# Patient Record
Sex: FEMALE | Race: BLACK OR AFRICAN AMERICAN | NOT HISPANIC OR LATINO | Employment: STUDENT | URBAN - METROPOLITAN AREA
[De-identification: names, ages, dates, MRNs, and addresses within clinical notes are randomized per-mention and may not be internally consistent; named-entity substitution may affect disease eponyms.]

---

## 2023-11-30 ENCOUNTER — HOSPITAL ENCOUNTER (OUTPATIENT)
Facility: HOSPITAL | Age: 18
End: 2023-11-30
Attending: OBSTETRICS & GYNECOLOGY | Admitting: OBSTETRICS & GYNECOLOGY
Payer: COMMERCIAL

## 2023-11-30 ENCOUNTER — HOSPITAL ENCOUNTER (OUTPATIENT)
Facility: HOSPITAL | Age: 18
Discharge: HOME | End: 2023-11-30
Attending: OBSTETRICS & GYNECOLOGY | Admitting: OBSTETRICS & GYNECOLOGY
Payer: COMMERCIAL

## 2023-11-30 VITALS
RESPIRATION RATE: 16 BRPM | HEIGHT: 69 IN | TEMPERATURE: 99 F | OXYGEN SATURATION: 95 % | WEIGHT: 201.72 LBS | HEART RATE: 86 BPM | DIASTOLIC BLOOD PRESSURE: 80 MMHG | SYSTOLIC BLOOD PRESSURE: 120 MMHG | BODY MASS INDEX: 29.88 KG/M2

## 2023-11-30 LAB
ABO GROUP (TYPE) IN BLOOD: NORMAL
ANTIBODY SCREEN: NORMAL
BILIRUBIN, POC: NEGATIVE
BLOOD URINE, POC: POSITIVE
C TRACH RRNA SPEC QL NAA+PROBE: NEGATIVE
CLARITY, POC: CLEAR
COLOR, POC: YELLOW
ERYTHROCYTE [DISTWIDTH] IN BLOOD BY AUTOMATED COUNT: 13 % (ref 11.5–14.5)
GLUCOSE URINE, POC: NEGATIVE
HBV SURFACE AG SERPL QL IA: NONREACTIVE
HCT VFR BLD AUTO: 38 % (ref 36–46)
HCV AB SER QL: NONREACTIVE
HGB BLD-MCNC: 12.4 G/DL (ref 12–16)
HIV 1+2 AB+HIV1 P24 AG SERPL QL IA: NONREACTIVE
KETONES, POC: NEGATIVE
LEUKOCYTE EST, POC: NORMAL
MCH RBC QN AUTO: 30.5 PG (ref 26–34)
MCHC RBC AUTO-ENTMCNC: 32.6 G/DL (ref 32–36)
MCV RBC AUTO: 94 FL (ref 80–100)
N GONORRHOEA DNA SPEC QL PROBE+SIG AMP: NEGATIVE
NITRITE, POC: POSITIVE
NRBC BLD-RTO: 0 /100 WBCS (ref 0–0)
PH, POC: 6.5
PLATELET # BLD AUTO: 270 X10*3/UL (ref 150–450)
POC APPEARANCE OF BODY FLUID: NORMAL
RBC # BLD AUTO: 4.06 X10*6/UL (ref 4–5.2)
RH FACTOR (ANTIGEN D): NORMAL
RUBV IGG SERPL IA-ACNC: 1.2 IA
RUBV IGG SERPL QL IA: POSITIVE
SPECIFIC GRAVITY, POC: 1.02
T VAGINALIS RRNA SPEC QL NAA+PROBE: NEGATIVE
URINE PROTEIN, POC: NEGATIVE
UROBILINOGEN, POC: 0.2
WBC # BLD AUTO: 11.6 X10*3/UL (ref 4.4–11.3)

## 2023-11-30 PROCEDURE — 87661 TRICHOMONAS VAGINALIS AMPLIF: CPT | Mod: 59 | Performed by: OBSTETRICS & GYNECOLOGY

## 2023-11-30 PROCEDURE — 86901 BLOOD TYPING SEROLOGIC RH(D): CPT | Performed by: OBSTETRICS & GYNECOLOGY

## 2023-11-30 PROCEDURE — 87800 DETECT AGNT MULT DNA DIREC: CPT | Performed by: OBSTETRICS & GYNECOLOGY

## 2023-11-30 PROCEDURE — 87086 URINE CULTURE/COLONY COUNT: CPT | Performed by: OBSTETRICS & GYNECOLOGY

## 2023-11-30 PROCEDURE — 87340 HEPATITIS B SURFACE AG IA: CPT | Performed by: OBSTETRICS & GYNECOLOGY

## 2023-11-30 PROCEDURE — 86803 HEPATITIS C AB TEST: CPT | Performed by: OBSTETRICS & GYNECOLOGY

## 2023-11-30 PROCEDURE — 85027 COMPLETE CBC AUTOMATED: CPT | Performed by: OBSTETRICS & GYNECOLOGY

## 2023-11-30 PROCEDURE — 86317 IMMUNOASSAY INFECTIOUS AGENT: CPT | Performed by: OBSTETRICS & GYNECOLOGY

## 2023-11-30 PROCEDURE — 36415 COLL VENOUS BLD VENIPUNCTURE: CPT | Performed by: OBSTETRICS & GYNECOLOGY

## 2023-11-30 PROCEDURE — 99203 OFFICE O/P NEW LOW 30 MIN: CPT | Performed by: OBSTETRICS & GYNECOLOGY

## 2023-11-30 PROCEDURE — 87389 HIV-1 AG W/HIV-1&-2 AB AG IA: CPT | Performed by: OBSTETRICS & GYNECOLOGY

## 2023-11-30 ASSESSMENT — COLUMBIA-SUICIDE SEVERITY RATING SCALE - C-SSRS
6. HAVE YOU EVER DONE ANYTHING, STARTED TO DO ANYTHING, OR PREPARED TO DO ANYTHING TO END YOUR LIFE?: NO
2. HAVE YOU ACTUALLY HAD ANY THOUGHTS OF KILLING YOURSELF?: NO
1. IN THE PAST MONTH, HAVE YOU WISHED YOU WERE DEAD OR WISHED YOU COULD GO TO SLEEP AND NOT WAKE UP?: NO

## 2023-11-30 ASSESSMENT — PAIN - FUNCTIONAL ASSESSMENT: PAIN_FUNCTIONAL_ASSESSMENT: 0-10

## 2023-11-30 ASSESSMENT — PAIN SCALES - GENERAL: PAINLEVEL_OUTOF10: 0 - NO PAIN

## 2023-11-30 NOTE — H&P
"Triage Note  A/P: 18 year old  at 20+2 weeks by bedside ultrasound today presenting for evaluation of newly diagnosed pregnancy. No concerns today    - prenatal labs ordered  - dating ultrasound ordered  - message sent to [place] [phone] office to call her to facilitate initial OB visit   - UA with positive nitrites, asymptomatic, will follow up with uri  - stable for discharge home for outpatient follow up.       HPI:   18 year old  at 20+2 by bedside ultrasound today (LMP known but cycles irregular) presenting to triage to make sure everything is ok with the pregnancy. She found out she was pregnancy at a family medicine office visit at [facility] last week. She was supposed to have an ultrasound on  but it was cancelled due to the snow. She is sure of her LMP but her menses were irregular so she did not realize she was pregnant.   She would like to get prenatal care at .      Obstetrical History   OB History    Para Term  AB Living   1             SAB IAB Ectopic Multiple Live Births                  # Outcome Date GA Lbr Chivo/2nd Weight Sex Delivery Anes PTL Lv   1 Current                Past Medical History  History reviewed. No pertinent past medical history.     Past Surgical History   History reviewed. No pertinent surgical history.    Social History  Social History     Tobacco Use    Smoking status: Unknown    Smokeless tobacco: Not on file   Substance Use Topics    Alcohol use: Not on file     Substance and Sexual Activity   Drug Use Not on file       Allergies  Patient has no known allergies.     Medications  Medications Prior to Admission   Medication Sig Dispense Refill Last Dose    PRENATAL 2-IRON-FOLIC ACID-OM3 ORAL Take by mouth.   2023       Objective    Last Vitals  Temp Pulse Resp BP MAP O2 Sat   37.2 °C (99 °F) 102 16 139/76   95 %     Physical Examination  Objective   /80   Pulse 86   Temp 37.2 °C (99 °F) (Skin)   Resp 16   Ht 1.753 m (5' 9.02\")   Wt 91.5 " kg (201 lb 11.5 oz)   SpO2 95%   BMI 29.78 kg/m²      General:   Alert and oriented, in no acute distress   Neck: Supple. No visible thyromegaly.    Breast/Axilla:    Abdomen: Soft, non-tender, without masses or organomegaly   Vulva:    Vagina:    Cervix:    Uterus:    Adnexa:    Pelvic Floor    Psych Normal affect. Normal mood.          Lab Review  None

## 2023-12-02 LAB — BACTERIA UR CULT: ABNORMAL

## 2023-12-04 ENCOUNTER — TELEPHONE (OUTPATIENT)
Dept: OBSTETRICS AND GYNECOLOGY | Facility: HOSPITAL | Age: 18
End: 2023-12-04
Payer: COMMERCIAL

## 2023-12-06 ENCOUNTER — TELEPHONE (OUTPATIENT)
Dept: OBSTETRICS AND GYNECOLOGY | Facility: HOSPITAL | Age: 18
End: 2023-12-06
Payer: COMMERCIAL

## 2023-12-10 ENCOUNTER — TELEPHONE (OUTPATIENT)
Dept: PEDIATRICS | Facility: HOSPITAL | Age: 18
End: 2023-12-10
Payer: COMMERCIAL

## 2023-12-10 ENCOUNTER — TELEPHONE (OUTPATIENT)
Dept: OBSTETRICS AND GYNECOLOGY | Facility: HOSPITAL | Age: 18
End: 2023-12-10
Payer: COMMERCIAL

## 2023-12-10 DIAGNOSIS — O23.42: Primary | ICD-10-CM

## 2023-12-10 RX ORDER — NITROFURANTOIN 25; 75 MG/1; MG/1
100 CAPSULE ORAL EVERY 12 HOURS SCHEDULED
Qty: 14 CAPSULE | Refills: 0 | Status: SHIPPED | OUTPATIENT
Start: 2023-12-10 | End: 2023-12-17

## 2023-12-10 NOTE — TELEPHONE ENCOUNTER
Pt seen in triage 11/30/23. + UTI  After 4 attempts to reach pt and no return phone call from pt,  script sent to pharmacy on record for Macrobid 100 Q 12 x 7 days based on culture sensitivity.     Jenelle Rasheed, APRN-CNP

## 2023-12-12 ENCOUNTER — INITIAL PRENATAL (OUTPATIENT)
Dept: OBSTETRICS AND GYNECOLOGY | Facility: CLINIC | Age: 18
End: 2023-12-12
Payer: COMMERCIAL

## 2023-12-12 VITALS — WEIGHT: 197 LBS | BODY MASS INDEX: 29.08 KG/M2 | SYSTOLIC BLOOD PRESSURE: 122 MMHG | DIASTOLIC BLOOD PRESSURE: 80 MMHG

## 2023-12-12 DIAGNOSIS — Z34.80 SUPERVISION OF OTHER NORMAL PREGNANCY, ANTEPARTUM (HHS-HCC): Primary | ICD-10-CM

## 2023-12-12 DIAGNOSIS — Z3A.20 20 WEEKS GESTATION OF PREGNANCY (HHS-HCC): ICD-10-CM

## 2023-12-12 PROCEDURE — 99214 OFFICE O/P EST MOD 30 MIN: CPT | Performed by: OBSTETRICS & GYNECOLOGY

## 2023-12-12 PROCEDURE — 88175 CYTOPATH C/V AUTO FLUID REDO: CPT

## 2023-12-12 RX ORDER — PRENATAL WITH FERROUS FUM AND FOLIC ACID 3080; 920; 120; 400; 22; 1.84; 3; 20; 10; 1; 12; 200; 27; 25; 2 [IU]/1; [IU]/1; MG/1; [IU]/1; MG/1; MG/1; MG/1; MG/1; MG/1; MG/1; UG/1; MG/1; MG/1; MG/1; MG/1
1 TABLET ORAL DAILY
COMMUNITY
Start: 2023-11-26

## 2023-12-12 ASSESSMENT — EDINBURGH POSTNATAL DEPRESSION SCALE (EPDS)
I HAVE FELT SAD OR MISERABLE: NO, NOT AT ALL
THINGS HAVE BEEN GETTING ON TOP OF ME: NO, I HAVE BEEN COPING AS WELL AS EVER
I HAVE BEEN SO UNHAPPY THAT I HAVE HAD DIFFICULTY SLEEPING: NOT AT ALL
I HAVE BEEN ANXIOUS OR WORRIED FOR NO GOOD REASON: HARDLY EVER
I HAVE FELT SCARED OR PANICKY FOR NO GOOD REASON: NO, NOT AT ALL
TOTAL SCORE: 1
I HAVE BEEN ABLE TO LAUGH AND SEE THE FUNNY SIDE OF THINGS: AS MUCH AS I ALWAYS COULD
I HAVE BEEN SO UNHAPPY THAT I HAVE BEEN CRYING: NO, NEVER
I HAVE LOOKED FORWARD WITH ENJOYMENT TO THINGS: AS MUCH AS I EVER DID
I HAVE BLAMED MYSELF UNNECESSARILY WHEN THINGS WENT WRONG: NO, NEVER
THE THOUGHT OF HARMING MYSELF HAS OCCURRED TO ME: NEVER

## 2023-12-12 NOTE — PROGRESS NOTES
Subjective   Patient ID [id number]   Pia Olivo is a 18 y.o.  at 24w0d with a working estimated date of delivery of 2024, by Last Menstrual Period who presents for an initial prenatal visit. This pregnancy is planned.    Her pregnancy is complicated by:  Late care    OB History    Para Term  AB Living   2 0     1     SAB IAB Ectopic Multiple Live Births     1            # Outcome Date GA Lbr Chivo/2nd Weight Sex Delivery Anes PTL Lv   2 Current            1 IAB              [place]  Depression Scale Total: 1    Objective   Physical Exam  Weight: 89.4 kg (197 lb)  Expected Total Weight Gain: 7 kg (15 lb)-11.5 kg (25 lb)   Pregravid BMI: 27.16  BP: 122/80    Fetal Heart Rate: 147     OBGyn Exam    Prenatal Labs  done    Assessment/Plan   Problem List Items Addressed This Visit    None  Visit Diagnoses         Codes    Supervision of other normal pregnancy, antepartum    -  Primary Z34.80    Relevant Orders    THINPREP PAP    US MAC OB imaging order    20 weeks gestation of pregnancy     Z3A.20              Prenatal Labs ordered  Daily prenatal vitamins prescribed  First trimester screening and second trimester screening discussed. Patient decided to \  Follow up in 2 weeks for return OB visit.

## 2023-12-28 ENCOUNTER — ANCILLARY PROCEDURE (OUTPATIENT)
Dept: RADIOLOGY | Facility: CLINIC | Age: 18
End: 2023-12-28
Payer: COMMERCIAL

## 2023-12-28 DIAGNOSIS — Z34.80 SUPERVISION OF OTHER NORMAL PREGNANCY, ANTEPARTUM (HHS-HCC): ICD-10-CM

## 2023-12-28 LAB
CYTOLOGY CMNT CVX/VAG CYTO-IMP: NORMAL
LAB AP HPV GENOTYPE QUESTION: YES
LAB AP HPV HR: NORMAL
LABORATORY COMMENT REPORT: NORMAL
LMP START DATE: NORMAL
PATH REPORT.TOTAL CANCER: NORMAL

## 2023-12-28 PROCEDURE — 76811 OB US DETAILED SNGL FETUS: CPT

## 2023-12-28 PROCEDURE — 76811 OB US DETAILED SNGL FETUS: CPT | Performed by: STUDENT IN AN ORGANIZED HEALTH CARE EDUCATION/TRAINING PROGRAM

## 2023-12-28 PROCEDURE — 76820 UMBILICAL ARTERY ECHO: CPT | Performed by: STUDENT IN AN ORGANIZED HEALTH CARE EDUCATION/TRAINING PROGRAM

## 2023-12-29 ENCOUNTER — APPOINTMENT (OUTPATIENT)
Dept: OBSTETRICS AND GYNECOLOGY | Facility: HOSPITAL | Age: 18
End: 2023-12-29
Payer: COMMERCIAL

## 2024-01-04 ENCOUNTER — OFFICE VISIT (OUTPATIENT)
Dept: OBSTETRICS AND GYNECOLOGY | Facility: CLINIC | Age: 19
End: 2024-01-04
Payer: COMMERCIAL

## 2024-01-04 ENCOUNTER — LAB (OUTPATIENT)
Dept: LAB | Facility: LAB | Age: 19
End: 2024-01-04
Payer: COMMERCIAL

## 2024-01-04 VITALS — BODY MASS INDEX: 29.52 KG/M2 | DIASTOLIC BLOOD PRESSURE: 60 MMHG | SYSTOLIC BLOOD PRESSURE: 112 MMHG | WEIGHT: 200 LBS

## 2024-01-04 DIAGNOSIS — Z34.80 SUPERVISION OF OTHER NORMAL PREGNANCY, ANTEPARTUM (HHS-HCC): ICD-10-CM

## 2024-01-04 DIAGNOSIS — Z34.80 SUPERVISION OF OTHER NORMAL PREGNANCY, ANTEPARTUM (HHS-HCC): Primary | ICD-10-CM

## 2024-01-04 DIAGNOSIS — Z3A.24 24 WEEKS GESTATION OF PREGNANCY (HHS-HCC): ICD-10-CM

## 2024-01-04 PROCEDURE — 99213 OFFICE O/P EST LOW 20 MIN: CPT | Performed by: OBSTETRICS & GYNECOLOGY

## 2024-01-04 PROCEDURE — 36415 COLL VENOUS BLD VENIPUNCTURE: CPT

## 2024-01-04 PROCEDURE — 1036F TOBACCO NON-USER: CPT | Performed by: OBSTETRICS & GYNECOLOGY

## 2024-01-04 NOTE — PROGRESS NOTES
"Subjective   Patient ID [id number]   Pia Olivo is a 18 y.o.  at 24w1d with a working estimated date of delivery of 2024, by Ultrasound who presents for a routine prenatal visit. She denies vaginal bleeding, leakage of fluid, decreased fetal movements, or contractions.    Her pregnancy is complicated by:  none    Objective   Physical Exam  Weight: 90.7 kg (200 lb)  Expected Total Weight Gain: 7 kg (15 lb)-11.5 kg (25 lb)   Pregravid BMI: 27.16  BP: 112/60  Fetal Heart Rate: 144      Prenatal Labs  Urine dip:  Lab Results   Component Value Date    KETONESU Negative 2023    GLUCOSEUR NEGATIVE 2023    LEUKOCYTESUR 1+ 2023       Lab Results   Component Value Date    HGB 12.4 2023    HCT 38.0 2023    ABO A 2023    HEPBSAG Nonreactive 2023     No results found for: \"PAPPA\", \"AFP\", \"HCG\", \"ESTRIOL\", \"INHBA\"  No results found for: \"GLUF\", \"GLUT1\", \"BIUUHBZ2QK\", \"YOMOQYZ3QM\"    Imaging  The most recent ultrasound was performed on The most recent ultrasound study is not finalized with a study GA of The most recent ultrasound study is not finalized and EFW of The most recent ultrasound study is not finalized.  The most recent ultrasound study is not finalized  The most recent ultrasound study is not finalized    Assessment/Plan   Problem List Items Addressed This Visit    None  Visit Diagnoses         Codes    Supervision of other normal pregnancy, antepartum    -  Primary Z34.80    Relevant Orders    [facility] Prenatal Screen    24 weeks gestation of pregnancy     Z3A.24    Relevant Orders    [facility] Prenatal Screen            Continue prenatal vitamin.  Labs reviewed.  Follow up in 4 weeks for a routine prenatal visit.  "

## 2024-01-19 ENCOUNTER — HOSPITAL ENCOUNTER (OUTPATIENT)
Dept: RADIOLOGY | Facility: CLINIC | Age: 19
Discharge: HOME | End: 2024-01-19
Payer: COMMERCIAL

## 2024-01-19 DIAGNOSIS — Z32.01 PREGNANCY TEST POSITIVE (HHS-HCC): ICD-10-CM

## 2024-01-19 DIAGNOSIS — O36.5990 IUGR (INTRAUTERINE GROWTH RESTRICTION) AFFECTING CARE OF MOTHER (HHS-HCC): ICD-10-CM

## 2024-01-19 PROCEDURE — 76820 UMBILICAL ARTERY ECHO: CPT

## 2024-01-19 PROCEDURE — 76816 OB US FOLLOW-UP PER FETUS: CPT

## 2024-01-19 PROCEDURE — 76820 UMBILICAL ARTERY ECHO: CPT | Performed by: OBSTETRICS & GYNECOLOGY

## 2024-01-19 PROCEDURE — 76816 OB US FOLLOW-UP PER FETUS: CPT | Performed by: OBSTETRICS & GYNECOLOGY

## 2024-01-19 PROCEDURE — 76819 FETAL BIOPHYS PROFIL W/O NST: CPT | Performed by: OBSTETRICS & GYNECOLOGY

## 2024-02-01 ENCOUNTER — LAB (OUTPATIENT)
Dept: LAB | Facility: LAB | Age: 19
End: 2024-02-01
Payer: COMMERCIAL

## 2024-02-01 ENCOUNTER — ROUTINE PRENATAL (OUTPATIENT)
Dept: OBSTETRICS AND GYNECOLOGY | Facility: CLINIC | Age: 19
End: 2024-02-01
Payer: COMMERCIAL

## 2024-02-01 VITALS — WEIGHT: 200.6 LBS | DIASTOLIC BLOOD PRESSURE: 82 MMHG | SYSTOLIC BLOOD PRESSURE: 118 MMHG | BODY MASS INDEX: 29.61 KG/M2

## 2024-02-01 DIAGNOSIS — Z3A.28 28 WEEKS GESTATION OF PREGNANCY (HHS-HCC): ICD-10-CM

## 2024-02-01 DIAGNOSIS — Z34.80 SUPERVISION OF OTHER NORMAL PREGNANCY, ANTEPARTUM (HHS-HCC): Primary | ICD-10-CM

## 2024-02-01 DIAGNOSIS — Z34.80 SUPERVISION OF OTHER NORMAL PREGNANCY, ANTEPARTUM (HHS-HCC): ICD-10-CM

## 2024-02-01 LAB
ERYTHROCYTE [DISTWIDTH] IN BLOOD BY AUTOMATED COUNT: 12.3 % (ref 11.5–14.5)
GLUCOSE 1H P 50 G GLC PO SERPL-MCNC: 62 MG/DL
HCT VFR BLD AUTO: 37.1 % (ref 36–46)
HGB BLD-MCNC: 12.1 G/DL (ref 12–16)
MCH RBC QN AUTO: 30.9 PG (ref 26–34)
MCHC RBC AUTO-ENTMCNC: 32.6 G/DL (ref 32–36)
MCV RBC AUTO: 95 FL (ref 80–100)
NRBC BLD-RTO: 0 /100 WBCS (ref 0–0)
PLATELET # BLD AUTO: 256 X10*3/UL (ref 150–450)
RBC # BLD AUTO: 3.92 X10*6/UL (ref 4–5.2)
REFLEX ADDED, ANEMIA PANEL: NORMAL
TREPONEMA PALLIDUM IGG+IGM AB [PRESENCE] IN SERUM OR PLASMA BY IMMUNOASSAY: NONREACTIVE
WBC # BLD AUTO: 8.3 X10*3/UL (ref 4.4–11.3)

## 2024-02-01 PROCEDURE — 99213 OFFICE O/P EST LOW 20 MIN: CPT | Performed by: OBSTETRICS & GYNECOLOGY

## 2024-02-01 PROCEDURE — 85027 COMPLETE CBC AUTOMATED: CPT

## 2024-02-01 PROCEDURE — 86780 TREPONEMA PALLIDUM: CPT

## 2024-02-01 PROCEDURE — 36415 COLL VENOUS BLD VENIPUNCTURE: CPT

## 2024-02-01 PROCEDURE — 82947 ASSAY GLUCOSE BLOOD QUANT: CPT

## 2024-02-01 NOTE — PROGRESS NOTES
"Subjective   Patient ID [id number]   Pia Olivo is a 18 y.o.  at 28w1d with a working estimated date of delivery of 2024, by Ultrasound who presents for a routine prenatal visit. She denies vaginal bleeding, leakage of fluid, decreased fetal movements, or contractions.    Her pregnancy is complicated by:  none    Objective   Physical Exam:   Weight: 91 kg (200 lb 9.6 oz)  Expected Total Weight Gain: 7 kg (15 lb)-11.5 kg (25 lb)   Pregravid BMI: 27.16  BP: 118/82  Fetal Heart Rate: 152 Fundal Height (cm): 29 cm             Prenatal Labs  Urine Dip:  Lab Results   Component Value Date    KETONESU Negative 2023    GLUCOSEUR NEGATIVE 2023    LEUKOCYTESUR 1+ 2023     Lab Results   Component Value Date    HGB 12.4 2023    HCT 38.0 2023    ABO A 2023    HEPBSAG Nonreactive 2023     No results found for: \"PAPPA\", \"AFP\", \"HCG\", \"ESTRIOL\", \"INHBA\"  No results found for: \"GLUF\", \"GLUT1\", \"HEKURYE9JR\", \"[id number]\"    Imaging  The most recent ultrasound was performed on   with a study GA of   and EFW of  .          Assessment/Plan   Problem List Items Addressed This Visit    None  Visit Diagnoses         Codes    Supervision of other normal pregnancy, antepartum    -  Primary Z34.80    Relevant Orders    Glucose, 1 Hour Screen, Pregnancy    Syphilis Screen with Reflex    CBC Anemia Panel With Reflex, Pregnancy    28 weeks gestation of pregnancy     Z3A.28    Relevant Orders    Glucose, 1 Hour Screen, Pregnancy    Syphilis Screen with Reflex    CBC Anemia Panel With Reflex, Pregnancy          Continue prenatal vitamin.  Labs reviewed.  GBS taken.  Expected mode of delivery vag  Follow up in 1 week for a routine prenatal visit.  "

## 2024-02-12 ENCOUNTER — APPOINTMENT (OUTPATIENT)
Dept: OBSTETRICS AND GYNECOLOGY | Facility: CLINIC | Age: 19
End: 2024-02-12
Payer: COMMERCIAL

## 2024-02-12 ENCOUNTER — HOSPITAL ENCOUNTER (OUTPATIENT)
Dept: RADIOLOGY | Facility: CLINIC | Age: 19
Discharge: HOME | End: 2024-02-12
Payer: COMMERCIAL

## 2024-02-12 DIAGNOSIS — Z32.01 PREGNANCY TEST POSITIVE (HHS-HCC): ICD-10-CM

## 2024-02-12 DIAGNOSIS — Z36.4 ULTRASOUND FOR ANTENATAL SCREENING FOR FETAL GROWTH RESTRICTION (HHS-HCC): Primary | ICD-10-CM

## 2024-02-12 PROCEDURE — 76816 OB US FOLLOW-UP PER FETUS: CPT | Performed by: MEDICAL GENETICS

## 2024-02-12 PROCEDURE — 76819 FETAL BIOPHYS PROFIL W/O NST: CPT

## 2024-02-12 PROCEDURE — 76816 OB US FOLLOW-UP PER FETUS: CPT

## 2024-02-12 PROCEDURE — 76820 UMBILICAL ARTERY ECHO: CPT | Performed by: MEDICAL GENETICS

## 2024-02-12 PROCEDURE — 76820 UMBILICAL ARTERY ECHO: CPT

## 2024-02-12 PROCEDURE — 76819 FETAL BIOPHYS PROFIL W/O NST: CPT | Performed by: MEDICAL GENETICS

## 2024-02-13 ENCOUNTER — ROUTINE PRENATAL (OUTPATIENT)
Dept: OBSTETRICS AND GYNECOLOGY | Facility: CLINIC | Age: 19
End: 2024-02-13
Payer: COMMERCIAL

## 2024-02-13 VITALS — BODY MASS INDEX: 30.26 KG/M2 | WEIGHT: 205 LBS | DIASTOLIC BLOOD PRESSURE: 80 MMHG | SYSTOLIC BLOOD PRESSURE: 122 MMHG

## 2024-02-13 DIAGNOSIS — Z3A.29 29 WEEKS GESTATION OF PREGNANCY (HHS-HCC): ICD-10-CM

## 2024-02-13 DIAGNOSIS — O36.5990 FETAL GROWTH RESTRICTION ANTEPARTUM (HHS-HCC): Primary | ICD-10-CM

## 2024-02-13 PROCEDURE — 99213 OFFICE O/P EST LOW 20 MIN: CPT | Performed by: OBSTETRICS & GYNECOLOGY

## 2024-02-13 NOTE — PROGRESS NOTES
Subjective   Patient ID [id number]   Pia Olivo is a 18 y.o.  at 29w6d with a working estimated date of delivery of 2024, by Ultrasound who presents for a routine prenatal visit. She denies vaginal bleeding, leakage of fluid, decreased fetal movements, or contractions.    Her pregnancy is complicated by:  FGR    Objective   Physical Exam:   Weight: 93 kg (205 lb)  Expected Total Weight Gain: 7 kg (15 lb)-11.5 kg (25 lb)   Pregravid BMI: 27.16  BP: 122/80  Fetal Heart Rate: 156 Fundal Height (cm): 31 cm             Prenatal Labs  Urine Dip:  Lab Results   Component Value Date    KETONESU Negative 2023    GLUCOSEUR NEGATIVE 2023    LEUKOCYTESUR 1+ 2023     Lab Results   Component Value Date    HGB 12.1 2024    HCT 37.1 2024    ABO A 2023    HEPBSAG Nonreactive 2023           Assessment/Plan   Problem List Items Addressed This Visit    None  Visit Diagnoses         Codes    Fetal growth restriction antepartum    -  Primary O36.5990    29 weeks gestation of pregnancy     Z3A.29          Continue prenatal vitamin.  Labs reviewed.  GBS taken.  Expected mode of delivery vag  Follow up in 1 week for a routine prenatal visit.

## 2024-02-22 ENCOUNTER — HOSPITAL ENCOUNTER (OUTPATIENT)
Dept: RADIOLOGY | Facility: CLINIC | Age: 19
Discharge: HOME | End: 2024-02-22
Payer: COMMERCIAL

## 2024-02-22 DIAGNOSIS — Z36.4 ULTRASOUND FOR ANTENATAL SCREENING FOR FETAL GROWTH RESTRICTION (HHS-HCC): ICD-10-CM

## 2024-02-22 PROCEDURE — 76819 FETAL BIOPHYS PROFIL W/O NST: CPT | Performed by: STUDENT IN AN ORGANIZED HEALTH CARE EDUCATION/TRAINING PROGRAM

## 2024-02-22 PROCEDURE — 76820 UMBILICAL ARTERY ECHO: CPT | Performed by: STUDENT IN AN ORGANIZED HEALTH CARE EDUCATION/TRAINING PROGRAM

## 2024-02-22 PROCEDURE — 76820 UMBILICAL ARTERY ECHO: CPT

## 2024-02-22 PROCEDURE — 76819 FETAL BIOPHYS PROFIL W/O NST: CPT

## 2024-02-27 ENCOUNTER — ROUTINE PRENATAL (OUTPATIENT)
Dept: OBSTETRICS AND GYNECOLOGY | Facility: CLINIC | Age: 19
End: 2024-02-27
Payer: COMMERCIAL

## 2024-02-27 VITALS — SYSTOLIC BLOOD PRESSURE: 118 MMHG | WEIGHT: 202 LBS | BODY MASS INDEX: 29.82 KG/M2 | DIASTOLIC BLOOD PRESSURE: 70 MMHG

## 2024-02-27 DIAGNOSIS — O36.5990 FETAL GROWTH RESTRICTION ANTEPARTUM (HHS-HCC): Primary | ICD-10-CM

## 2024-02-27 DIAGNOSIS — Z3A.31 31 WEEKS GESTATION OF PREGNANCY (HHS-HCC): ICD-10-CM

## 2024-02-27 PROCEDURE — 99213 OFFICE O/P EST LOW 20 MIN: CPT | Performed by: OBSTETRICS & GYNECOLOGY

## 2024-02-27 NOTE — PROGRESS NOTES
Subjective   Patient ID [id number]   Pai Olivo is a 19 y.o.  at 31w6d with a working estimated date of delivery of 2024, by Ultrasound who presents for a routine prenatal visit. She denies vaginal bleeding, leakage of fluid, decreased fetal movements, or contractions.    Her pregnancy is complicated by:  FGR    Objective   Physical Exam:   Weight: 91.6 kg (202 lb)  Expected Total Weight Gain: 7 kg (15 lb)-11.5 kg (25 lb)   Pregravid BMI: 27.16  BP: 118/70  Fetal Heart Rate: 142 Fundal Height (cm): 31 cm             Prenatal Labs  Urine Dip:  Lab Results   Component Value Date    KETONESU Negative 2023    GLUCOSEUR NEGATIVE 2023    LEUKOCYTESUR 1+ 2023     Lab Results   Component Value Date    HGB 12.1 2024    HCT 37.1 2024    ABO A 2023    HEPBSAG Nonreactive 2023       Assessment/Plan   Problem List Items Addressed This Visit    None  Visit Diagnoses         Codes    Fetal growth restriction antepartum    -  Primary O36.5990    31 weeks gestation of pregnancy     Z3A.31          Continue prenatal vitamin.  Labs reviewed.  Expected mode of delivery vag  Follow up in 1 week for a routine prenatal visit.

## 2024-03-01 ENCOUNTER — HOSPITAL ENCOUNTER (OUTPATIENT)
Dept: RADIOLOGY | Facility: CLINIC | Age: 19
Discharge: HOME | End: 2024-03-01
Payer: COMMERCIAL

## 2024-03-01 DIAGNOSIS — Z36.4 ULTRASOUND FOR ANTENATAL SCREENING FOR FETAL GROWTH RESTRICTION (HHS-HCC): ICD-10-CM

## 2024-03-01 DIAGNOSIS — Z32.01 PREGNANCY TEST POSITIVE (HHS-HCC): ICD-10-CM

## 2024-03-01 PROCEDURE — 76820 UMBILICAL ARTERY ECHO: CPT | Performed by: OBSTETRICS & GYNECOLOGY

## 2024-03-01 PROCEDURE — 76819 FETAL BIOPHYS PROFIL W/O NST: CPT

## 2024-03-01 PROCEDURE — 76820 UMBILICAL ARTERY ECHO: CPT

## 2024-03-01 PROCEDURE — 76819 FETAL BIOPHYS PROFIL W/O NST: CPT | Performed by: OBSTETRICS & GYNECOLOGY

## 2024-03-08 ENCOUNTER — HOSPITAL ENCOUNTER (OUTPATIENT)
Dept: RADIOLOGY | Facility: CLINIC | Age: 19
Discharge: HOME | End: 2024-03-08
Payer: COMMERCIAL

## 2024-03-08 DIAGNOSIS — Z32.01 PREGNANCY TEST POSITIVE (HHS-HCC): ICD-10-CM

## 2024-03-08 PROCEDURE — 76816 OB US FOLLOW-UP PER FETUS: CPT

## 2024-03-08 PROCEDURE — 76819 FETAL BIOPHYS PROFIL W/O NST: CPT

## 2024-03-08 PROCEDURE — 76816 OB US FOLLOW-UP PER FETUS: CPT | Performed by: OBSTETRICS & GYNECOLOGY

## 2024-03-08 PROCEDURE — 76819 FETAL BIOPHYS PROFIL W/O NST: CPT | Performed by: OBSTETRICS & GYNECOLOGY

## 2024-03-12 ENCOUNTER — ROUTINE PRENATAL (OUTPATIENT)
Dept: OBSTETRICS AND GYNECOLOGY | Facility: CLINIC | Age: 19
End: 2024-03-12
Payer: COMMERCIAL

## 2024-03-12 VITALS — DIASTOLIC BLOOD PRESSURE: 72 MMHG | BODY MASS INDEX: 30.11 KG/M2 | WEIGHT: 204 LBS | SYSTOLIC BLOOD PRESSURE: 123 MMHG

## 2024-03-12 DIAGNOSIS — Z3A.33 33 WEEKS GESTATION OF PREGNANCY (HHS-HCC): ICD-10-CM

## 2024-03-12 DIAGNOSIS — O36.5990 FETAL GROWTH RESTRICTION ANTEPARTUM (HHS-HCC): Primary | ICD-10-CM

## 2024-03-12 PROCEDURE — 99213 OFFICE O/P EST LOW 20 MIN: CPT | Performed by: OBSTETRICS & GYNECOLOGY

## 2024-03-12 NOTE — PROGRESS NOTES
Subjective   Patient ID [id number]   Pia Olivo is a 19 y.o.  at 33w6d with a working estimated date of delivery of 2024, by Ultrasound who presents for a routine prenatal visit. She denies vaginal bleeding, leakage of fluid, decreased fetal movements, or contractions.    Her pregnancy is complicated by:  Growth lag    Objective   Physical Exam:   Weight: 92.5 kg (204 lb)  Expected Total Weight Gain: 7 kg (15 lb)-11.5 kg (25 lb)   Pregravid BMI: 27.16  BP: 123/72  Fetal Heart Rate: 147 Fundal Height (cm): 32 cm             Prenatal Labs  Urine Dip:  Lab Results   Component Value Date    KETONESU Negative 2023    GLUCOSEUR NEGATIVE 2023    LEUKOCYTESUR 1+ 2023     Lab Results   Component Value Date    HGB 12.1 2024    HCT 37.1 2024    ABO A 2023    HEPBSAG Nonreactive 2023          Assessment/Plan   Problem List Items Addressed This Visit    None  Visit Diagnoses         Codes    Fetal growth restriction antepartum    -  Primary O36.5990    33 weeks gestation of pregnancy     Z3A.33          Continue prenatal vitamin.  Labs reviewed.  Expected mode of delivery vag  Follow up in 12week for a routine prenatal visit.

## 2024-03-14 ENCOUNTER — HOSPITAL ENCOUNTER (OUTPATIENT)
Dept: RADIOLOGY | Facility: CLINIC | Age: 19
Discharge: HOME | End: 2024-03-14
Payer: COMMERCIAL

## 2024-03-14 DIAGNOSIS — Z36.4 ULTRASOUND FOR ANTENATAL SCREENING FOR FETAL GROWTH RESTRICTION (HHS-HCC): ICD-10-CM

## 2024-03-14 PROCEDURE — 76819 FETAL BIOPHYS PROFIL W/O NST: CPT

## 2024-03-14 PROCEDURE — 76819 FETAL BIOPHYS PROFIL W/O NST: CPT | Performed by: STUDENT IN AN ORGANIZED HEALTH CARE EDUCATION/TRAINING PROGRAM

## 2024-03-20 ENCOUNTER — HOSPITAL ENCOUNTER (OUTPATIENT)
Dept: RADIOLOGY | Facility: CLINIC | Age: 19
Discharge: HOME | End: 2024-03-20
Payer: COMMERCIAL

## 2024-03-20 DIAGNOSIS — Z36.4 ULTRASOUND FOR ANTENATAL SCREENING FOR FETAL GROWTH RESTRICTION (HHS-HCC): ICD-10-CM

## 2024-03-20 PROCEDURE — 76820 UMBILICAL ARTERY ECHO: CPT | Performed by: OBSTETRICS & GYNECOLOGY

## 2024-03-20 PROCEDURE — 76819 FETAL BIOPHYS PROFIL W/O NST: CPT

## 2024-03-20 PROCEDURE — 76819 FETAL BIOPHYS PROFIL W/O NST: CPT | Performed by: OBSTETRICS & GYNECOLOGY

## 2024-03-26 ENCOUNTER — ROUTINE PRENATAL (OUTPATIENT)
Dept: OBSTETRICS AND GYNECOLOGY | Facility: CLINIC | Age: 19
End: 2024-03-26
Payer: COMMERCIAL

## 2024-03-26 VITALS — DIASTOLIC BLOOD PRESSURE: 88 MMHG | BODY MASS INDEX: 30.7 KG/M2 | WEIGHT: 208 LBS | SYSTOLIC BLOOD PRESSURE: 138 MMHG

## 2024-03-26 DIAGNOSIS — Z3A.35 35 WEEKS GESTATION OF PREGNANCY (HHS-HCC): ICD-10-CM

## 2024-03-26 DIAGNOSIS — Z34.80 SUPERVISION OF OTHER NORMAL PREGNANCY, ANTEPARTUM (HHS-HCC): Primary | ICD-10-CM

## 2024-03-26 PROCEDURE — 99213 OFFICE O/P EST LOW 20 MIN: CPT | Performed by: OBSTETRICS & GYNECOLOGY

## 2024-03-26 PROCEDURE — 87081 CULTURE SCREEN ONLY: CPT

## 2024-03-26 NOTE — PROGRESS NOTES
Subjective   Patient ID [id number]   Pia Olivo is a 19 y.o.  at 35w6d with a working estimated date of delivery of 2024, by Ultrasound who presents for a routine prenatal visit. She denies vaginal bleeding, leakage of fluid, decreased fetal movements, or contractions.    Her pregnancy is complicated by:  none    Objective   Physical Exam:   Weight: 94.3 kg (208 lb)  Expected Total Weight Gain: 7 kg (15 lb)-11.5 kg (25 lb)   Pregravid BMI: 27.16  BP: 138/88  Fetal Heart Rate: 145 Fundal Height (cm): 35 cm             Prenatal Labs  Urine Dip:  Lab Results   Component Value Date    KETONESU Negative 2023    GLUCOSEUR NEGATIVE 2023    LEUKOCYTESUR 1+ 2023     Lab Results   Component Value Date    HGB 12.1 2024    HCT 37.1 2024    ABO A 2023    HEPBSAG Nonreactive 2023     Assessment/Plan   Problem List Items Addressed This Visit    None  Visit Diagnoses         Codes    Supervision of other normal pregnancy, antepartum    -  Primary Z34.80    Relevant Orders    Group B Streptococcus (GBS) Prenatal Screen, Culture    35 weeks gestation of pregnancy     Z3A.35    Relevant Orders    Group B Streptococcus (GBS) Prenatal Screen, Culture          Continue prenatal vitamin.  Labs reviewed.  GBS taken.  Expected mode of delivery vag  Follow up in 1 week for a routine prenatal visit.  Discussed risk reducing induction

## 2024-03-28 ENCOUNTER — HOSPITAL ENCOUNTER (OUTPATIENT)
Dept: RADIOLOGY | Facility: CLINIC | Age: 19
Discharge: HOME | End: 2024-03-28
Payer: COMMERCIAL

## 2024-03-28 DIAGNOSIS — Z36.4 ULTRASOUND FOR ANTENATAL SCREENING FOR FETAL GROWTH RESTRICTION (HHS-HCC): ICD-10-CM

## 2024-03-28 PROCEDURE — 76819 FETAL BIOPHYS PROFIL W/O NST: CPT | Performed by: STUDENT IN AN ORGANIZED HEALTH CARE EDUCATION/TRAINING PROGRAM

## 2024-03-28 PROCEDURE — 76819 FETAL BIOPHYS PROFIL W/O NST: CPT

## 2024-03-28 PROCEDURE — 76816 OB US FOLLOW-UP PER FETUS: CPT | Performed by: STUDENT IN AN ORGANIZED HEALTH CARE EDUCATION/TRAINING PROGRAM

## 2024-03-28 PROCEDURE — 76816 OB US FOLLOW-UP PER FETUS: CPT

## 2024-03-30 LAB — GP B STREP GENITAL QL CULT: ABNORMAL

## 2024-04-02 ENCOUNTER — ROUTINE PRENATAL (OUTPATIENT)
Dept: OBSTETRICS AND GYNECOLOGY | Facility: CLINIC | Age: 19
End: 2024-04-02
Payer: COMMERCIAL

## 2024-04-02 VITALS — WEIGHT: 210 LBS | SYSTOLIC BLOOD PRESSURE: 120 MMHG | BODY MASS INDEX: 31 KG/M2 | DIASTOLIC BLOOD PRESSURE: 80 MMHG

## 2024-04-02 DIAGNOSIS — Z34.80 SUPERVISION OF OTHER NORMAL PREGNANCY, ANTEPARTUM (HHS-HCC): Primary | ICD-10-CM

## 2024-04-02 DIAGNOSIS — Z3A.36 36 WEEKS GESTATION OF PREGNANCY (HHS-HCC): ICD-10-CM

## 2024-04-02 PROCEDURE — 99213 OFFICE O/P EST LOW 20 MIN: CPT | Performed by: OBSTETRICS & GYNECOLOGY

## 2024-04-02 NOTE — PROGRESS NOTES
Subjective   Patient ID [id number]   Pia Olivo is a 19 y.o.  at 36w6d with a working estimated date of delivery of 2024, by Ultrasound who presents for a routine prenatal visit. She denies vaginal bleeding, leakage of fluid, decreased fetal movements, or contractions.    Her pregnancy is complicated by:  Gbs pos    Objective   Physical Exam:   Weight: 95.3 kg (210 lb)  Expected Total Weight Gain: 7 kg (15 lb)-11.5 kg (25 lb)   Pregravid BMI: 27.16  BP: 120/80  Fetal Heart Rate: 155 Fundal Height (cm): 36 cm             Prenatal Labs  Urine Dip:  Lab Results   Component Value Date    KETONESU Negative 2023    GLUCOSEUR NEGATIVE 2023    LEUKOCYTESUR 1+ 2023     Lab Results   Component Value Date    HGB 12.1 2024    HCT 37.1 2024    ABO A 2023    HEPBSAG Nonreactive 2023           Assessment/Plan   Problem List Items Addressed This Visit    None  Visit Diagnoses         Codes    Supervision of other normal pregnancy, antepartum    -  Primary Z34.80    36 weeks gestation of pregnancy     Z3A.36          Continue prenatal vitamin.  Labs reviewed.  GBS pos  Expected mode of delivery vag  Follow up in 1 week for a routine prenatal visit.

## 2024-04-04 ENCOUNTER — APPOINTMENT (OUTPATIENT)
Dept: RADIOLOGY | Facility: CLINIC | Age: 19
End: 2024-04-04
Payer: COMMERCIAL

## 2024-04-09 ENCOUNTER — ROUTINE PRENATAL (OUTPATIENT)
Dept: OBSTETRICS AND GYNECOLOGY | Facility: CLINIC | Age: 19
End: 2024-04-09
Payer: COMMERCIAL

## 2024-04-09 VITALS — WEIGHT: 209 LBS | DIASTOLIC BLOOD PRESSURE: 88 MMHG | BODY MASS INDEX: 30.85 KG/M2 | SYSTOLIC BLOOD PRESSURE: 133 MMHG

## 2024-04-09 DIAGNOSIS — Z34.80 SUPERVISION OF OTHER NORMAL PREGNANCY, ANTEPARTUM (HHS-HCC): Primary | ICD-10-CM

## 2024-04-09 DIAGNOSIS — Z3A.37 37 WEEKS GESTATION OF PREGNANCY (HHS-HCC): ICD-10-CM

## 2024-04-09 PROCEDURE — 99213 OFFICE O/P EST LOW 20 MIN: CPT | Performed by: OBSTETRICS & GYNECOLOGY

## 2024-04-09 NOTE — PROGRESS NOTES
Subjective   Patient ID [id number]   Pia Olivo is a 19 y.o.  at 37w6d with a working estimated date of delivery of 2024, by Ultrasound who presents for a routine prenatal visit. She denies vaginal bleeding, leakage of fluid, decreased fetal movements, or contractions.    Her pregnancy is complicated by:  Gbs pos    Objective   Physical Exam:   Weight: 94.8 kg (209 lb)  Expected Total Weight Gain: 7 kg (15 lb)-11.5 kg (25 lb)   Pregravid BMI: 27.16  BP: 133/88  Fetal Heart Rate: 157 Fundal Height (cm): 37 cm    Dilation: Closed        Prenatal Labs  Urine Dip:  Lab Results   Component Value Date    KETONESU Negative 2023    GLUCOSEUR NEGATIVE 2023    LEUKOCYTESUR 1+ 2023     Lab Results   Component Value Date    HGB 12.1 2024    HCT 37.1 2024    ABO A 2023    HEPBSAG Nonreactive 2023             Assessment/Plan   Problem List Items Addressed This Visit    None  Visit Diagnoses         Codes    Supervision of other normal pregnancy, antepartum    -  Primary Z34.80    37 weeks gestation of pregnancy     Z3A.37          Continue prenatal vitamin.  Labs reviewed.  GBS taken.  Expected mode of delivery vag  Follow up in 1 week for a routine prenatal visit.

## 2024-04-11 ENCOUNTER — APPOINTMENT (OUTPATIENT)
Dept: RADIOLOGY | Facility: CLINIC | Age: 19
End: 2024-04-11
Payer: COMMERCIAL

## 2024-04-15 ENCOUNTER — ANESTHESIA (OUTPATIENT)
Dept: OBSTETRICS AND GYNECOLOGY | Facility: HOSPITAL | Age: 19
End: 2024-04-15
Payer: COMMERCIAL

## 2024-04-15 ENCOUNTER — ANESTHESIA EVENT (OUTPATIENT)
Dept: OBSTETRICS AND GYNECOLOGY | Facility: HOSPITAL | Age: 19
End: 2024-04-15
Payer: COMMERCIAL

## 2024-04-15 ENCOUNTER — HOSPITAL ENCOUNTER (INPATIENT)
Facility: HOSPITAL | Age: 19
LOS: 3 days | Discharge: HOME | End: 2024-04-18
Attending: OBSTETRICS & GYNECOLOGY | Admitting: MIDWIFE
Payer: COMMERCIAL

## 2024-04-15 PROBLEM — O98.819 GROUP B STREPTOCOCCAL INFECTION DURING PREGNANCY (HHS-HCC): Status: ACTIVE | Noted: 2024-04-15

## 2024-04-15 PROBLEM — Z3A.38 PREGNANCY WITH 38 COMPLETED WEEKS GESTATION (HHS-HCC): Status: ACTIVE | Noted: 2024-04-15

## 2024-04-15 PROBLEM — B95.1 GROUP B STREPTOCOCCAL INFECTION DURING PREGNANCY (HHS-HCC): Status: ACTIVE | Noted: 2024-04-15

## 2024-04-15 PROBLEM — A49.1 GBS (GROUP B STREPTOCOCCUS) INFECTION: Status: ACTIVE | Noted: 2024-04-15

## 2024-04-15 PROBLEM — A49.1 GBS (GROUP B STREPTOCOCCUS) INFECTION: Status: RESOLVED | Noted: 2024-04-15 | Resolved: 2024-04-15

## 2024-04-15 PROBLEM — Z34.83: Status: ACTIVE | Noted: 2024-04-15

## 2024-04-15 LAB
ABO GROUP (TYPE) IN BLOOD: NORMAL
ALBUMIN SERPL BCP-MCNC: 2.9 G/DL (ref 3.4–5)
ALP SERPL-CCNC: 139 U/L (ref 33–110)
ALT SERPL W P-5'-P-CCNC: 8 U/L (ref 7–45)
ANION GAP SERPL CALC-SCNC: 15 MMOL/L (ref 10–20)
ANTIBODY SCREEN: NORMAL
AST SERPL W P-5'-P-CCNC: 14 U/L (ref 9–39)
BILIRUB SERPL-MCNC: 0.3 MG/DL (ref 0–1.2)
BUN SERPL-MCNC: 5 MG/DL (ref 6–23)
CALCIUM SERPL-MCNC: 8.2 MG/DL (ref 8.6–10.3)
CHLORIDE SERPL-SCNC: 107 MMOL/L (ref 98–107)
CO2 SERPL-SCNC: 20 MMOL/L (ref 21–32)
CREAT SERPL-MCNC: 0.54 MG/DL (ref 0.5–1.05)
EGFRCR SERPLBLD CKD-EPI 2021: >90 ML/MIN/1.73M*2
ERYTHROCYTE [DISTWIDTH] IN BLOOD BY AUTOMATED COUNT: 13.1 % (ref 11.5–14.5)
GLUCOSE SERPL-MCNC: 80 MG/DL (ref 74–99)
HCT VFR BLD AUTO: 38.2 % (ref 36–46)
HGB BLD-MCNC: 12.3 G/DL (ref 12–16)
MCH RBC QN AUTO: 29 PG (ref 26–34)
MCHC RBC AUTO-ENTMCNC: 32.2 G/DL (ref 32–36)
MCV RBC AUTO: 90 FL (ref 80–100)
NRBC BLD-RTO: 0 /100 WBCS (ref 0–0)
PLATELET # BLD AUTO: 265 X10*3/UL (ref 150–450)
POTASSIUM SERPL-SCNC: 3.5 MMOL/L (ref 3.5–5.3)
PROT SERPL-MCNC: 6.1 G/DL (ref 6.4–8.2)
RBC # BLD AUTO: 4.24 X10*6/UL (ref 4–5.2)
RH FACTOR (ANTIGEN D): NORMAL
SODIUM SERPL-SCNC: 138 MMOL/L (ref 136–145)
TREPONEMA PALLIDUM IGG+IGM AB [PRESENCE] IN SERUM OR PLASMA BY IMMUNOASSAY: NONREACTIVE
WBC # BLD AUTO: 10.5 X10*3/UL (ref 4.4–11.3)

## 2024-04-15 PROCEDURE — 36415 COLL VENOUS BLD VENIPUNCTURE: CPT | Performed by: MIDWIFE

## 2024-04-15 PROCEDURE — 2500000004 HC RX 250 GENERAL PHARMACY W/ HCPCS (ALT 636 FOR OP/ED): Performed by: NURSE ANESTHETIST, CERTIFIED REGISTERED

## 2024-04-15 PROCEDURE — 7210000002 HC LABOR PER HOUR

## 2024-04-15 PROCEDURE — 2500000001 HC RX 250 WO HCPCS SELF ADMINISTERED DRUGS (ALT 637 FOR MEDICARE OP): Performed by: MIDWIFE

## 2024-04-15 PROCEDURE — 2500000004 HC RX 250 GENERAL PHARMACY W/ HCPCS (ALT 636 FOR OP/ED): Performed by: ADVANCED PRACTICE MIDWIFE

## 2024-04-15 PROCEDURE — 51701 INSERT BLADDER CATHETER: CPT

## 2024-04-15 PROCEDURE — 2720000007 HC OR 272 NO HCPCS

## 2024-04-15 PROCEDURE — 2500000005 HC RX 250 GENERAL PHARMACY W/O HCPCS: Performed by: MIDWIFE

## 2024-04-15 PROCEDURE — 59409 OBSTETRICAL CARE: CPT | Performed by: MIDWIFE

## 2024-04-15 PROCEDURE — 86780 TREPONEMA PALLIDUM: CPT | Mod: AHULAB | Performed by: MIDWIFE

## 2024-04-15 PROCEDURE — 3E033VJ INTRODUCTION OF OTHER HORMONE INTO PERIPHERAL VEIN, PERCUTANEOUS APPROACH: ICD-10-PCS | Performed by: MIDWIFE

## 2024-04-15 PROCEDURE — 85027 COMPLETE CBC AUTOMATED: CPT | Performed by: MIDWIFE

## 2024-04-15 PROCEDURE — 88307 TISSUE EXAM BY PATHOLOGIST: CPT | Mod: TC,AHULAB | Performed by: MIDWIFE

## 2024-04-15 PROCEDURE — 1100000001 HC PRIVATE ROOM DAILY

## 2024-04-15 PROCEDURE — 88307 TISSUE EXAM BY PATHOLOGIST: CPT | Performed by: PATHOLOGY

## 2024-04-15 PROCEDURE — 01967 NEURAXL LBR ANES VAG DLVR: CPT | Performed by: NURSE ANESTHETIST, CERTIFIED REGISTERED

## 2024-04-15 PROCEDURE — 86901 BLOOD TYPING SEROLOGIC RH(D): CPT | Performed by: MIDWIFE

## 2024-04-15 PROCEDURE — 80053 COMPREHEN METABOLIC PANEL: CPT | Performed by: MIDWIFE

## 2024-04-15 PROCEDURE — 2500000004 HC RX 250 GENERAL PHARMACY W/ HCPCS (ALT 636 FOR OP/ED): Performed by: MIDWIFE

## 2024-04-15 RX ORDER — SIMETHICONE 80 MG
80 TABLET,CHEWABLE ORAL 4 TIMES DAILY PRN
Status: DISCONTINUED | OUTPATIENT
Start: 2024-04-15 | End: 2024-04-18 | Stop reason: HOSPADM

## 2024-04-15 RX ORDER — TRANEXAMIC ACID 100 MG/ML
1000 INJECTION, SOLUTION INTRAVENOUS ONCE AS NEEDED
Status: DISCONTINUED | OUTPATIENT
Start: 2024-04-15 | End: 2024-04-18 | Stop reason: HOSPADM

## 2024-04-15 RX ORDER — HYDRALAZINE HYDROCHLORIDE 20 MG/ML
5 INJECTION INTRAMUSCULAR; INTRAVENOUS ONCE AS NEEDED
Status: DISCONTINUED | OUTPATIENT
Start: 2024-04-15 | End: 2024-04-18 | Stop reason: HOSPADM

## 2024-04-15 RX ORDER — POLYETHYLENE GLYCOL 3350 17 G/17G
17 POWDER, FOR SOLUTION ORAL 2 TIMES DAILY PRN
Status: DISCONTINUED | OUTPATIENT
Start: 2024-04-15 | End: 2024-04-18 | Stop reason: HOSPADM

## 2024-04-15 RX ORDER — CARBOPROST TROMETHAMINE 250 UG/ML
250 INJECTION, SOLUTION INTRAMUSCULAR ONCE AS NEEDED
Status: DISCONTINUED | OUTPATIENT
Start: 2024-04-15 | End: 2024-04-15

## 2024-04-15 RX ORDER — IBUPROFEN 600 MG/1
600 TABLET ORAL EVERY 6 HOURS
Status: DISCONTINUED | OUTPATIENT
Start: 2024-04-15 | End: 2024-04-18 | Stop reason: HOSPADM

## 2024-04-15 RX ORDER — TERBUTALINE SULFATE 1 MG/ML
0.25 INJECTION SUBCUTANEOUS ONCE AS NEEDED
Status: DISCONTINUED | OUTPATIENT
Start: 2024-04-15 | End: 2024-04-15

## 2024-04-15 RX ORDER — ONDANSETRON 4 MG/1
4 TABLET, FILM COATED ORAL EVERY 6 HOURS PRN
Status: DISCONTINUED | OUTPATIENT
Start: 2024-04-15 | End: 2024-04-18 | Stop reason: HOSPADM

## 2024-04-15 RX ORDER — ONDANSETRON 4 MG/1
4 TABLET, FILM COATED ORAL EVERY 6 HOURS PRN
Status: DISCONTINUED | OUTPATIENT
Start: 2024-04-15 | End: 2024-04-15

## 2024-04-15 RX ORDER — OXYTOCIN/0.9 % SODIUM CHLORIDE 30/500 ML
2-30 PLASTIC BAG, INJECTION (ML) INTRAVENOUS CONTINUOUS
Status: DISCONTINUED | OUTPATIENT
Start: 2024-04-15 | End: 2024-04-15

## 2024-04-15 RX ORDER — LOPERAMIDE HYDROCHLORIDE 2 MG/1
4 CAPSULE ORAL EVERY 2 HOUR PRN
Status: DISCONTINUED | OUTPATIENT
Start: 2024-04-15 | End: 2024-04-15

## 2024-04-15 RX ORDER — LIDOCAINE HYDROCHLORIDE 10 MG/ML
30 INJECTION INFILTRATION; PERINEURAL ONCE AS NEEDED
Status: COMPLETED | OUTPATIENT
Start: 2024-04-15 | End: 2024-04-15

## 2024-04-15 RX ORDER — OXYTOCIN/0.9 % SODIUM CHLORIDE 30/500 ML
60 PLASTIC BAG, INJECTION (ML) INTRAVENOUS ONCE AS NEEDED
Status: DISCONTINUED | OUTPATIENT
Start: 2024-04-15 | End: 2024-04-18 | Stop reason: HOSPADM

## 2024-04-15 RX ORDER — DIPHENHYDRAMINE HCL 25 MG
25 CAPSULE ORAL EVERY 6 HOURS PRN
Status: DISCONTINUED | OUTPATIENT
Start: 2024-04-15 | End: 2024-04-18 | Stop reason: HOSPADM

## 2024-04-15 RX ORDER — ONDANSETRON HYDROCHLORIDE 2 MG/ML
4 INJECTION, SOLUTION INTRAVENOUS EVERY 6 HOURS PRN
Status: DISCONTINUED | OUTPATIENT
Start: 2024-04-15 | End: 2024-04-18 | Stop reason: HOSPADM

## 2024-04-15 RX ORDER — ADHESIVE BANDAGE
10 BANDAGE TOPICAL
Status: DISCONTINUED | OUTPATIENT
Start: 2024-04-15 | End: 2024-04-18 | Stop reason: HOSPADM

## 2024-04-15 RX ORDER — OXYTOCIN/0.9 % SODIUM CHLORIDE 30/500 ML
60 PLASTIC BAG, INJECTION (ML) INTRAVENOUS ONCE AS NEEDED
Status: COMPLETED | OUTPATIENT
Start: 2024-04-15 | End: 2024-04-15

## 2024-04-15 RX ORDER — ACETAMINOPHEN 325 MG/1
975 TABLET ORAL EVERY 6 HOURS
Status: DISCONTINUED | OUTPATIENT
Start: 2024-04-15 | End: 2024-04-18 | Stop reason: HOSPADM

## 2024-04-15 RX ORDER — OXYTOCIN 10 [USP'U]/ML
10 INJECTION, SOLUTION INTRAMUSCULAR; INTRAVENOUS ONCE AS NEEDED
Status: DISCONTINUED | OUTPATIENT
Start: 2024-04-15 | End: 2024-04-18 | Stop reason: HOSPADM

## 2024-04-15 RX ORDER — PENICILLIN G 3000000 [IU]/50ML
3 INJECTION, SOLUTION INTRAVENOUS EVERY 4 HOURS
Status: DISCONTINUED | OUTPATIENT
Start: 2024-04-15 | End: 2024-04-15

## 2024-04-15 RX ORDER — NIFEDIPINE 10 MG/1
10 CAPSULE ORAL ONCE AS NEEDED
Status: DISCONTINUED | OUTPATIENT
Start: 2024-04-15 | End: 2024-04-18 | Stop reason: HOSPADM

## 2024-04-15 RX ORDER — LABETALOL HYDROCHLORIDE 5 MG/ML
20 INJECTION, SOLUTION INTRAVENOUS ONCE AS NEEDED
Status: DISCONTINUED | OUTPATIENT
Start: 2024-04-15 | End: 2024-04-18 | Stop reason: HOSPADM

## 2024-04-15 RX ORDER — LIDOCAINE 560 MG/1
1 PATCH PERCUTANEOUS; TOPICAL; TRANSDERMAL
Status: DISCONTINUED | OUTPATIENT
Start: 2024-04-15 | End: 2024-04-18 | Stop reason: HOSPADM

## 2024-04-15 RX ORDER — OXYTOCIN 10 [USP'U]/ML
10 INJECTION, SOLUTION INTRAMUSCULAR; INTRAVENOUS ONCE AS NEEDED
Status: DISCONTINUED | OUTPATIENT
Start: 2024-04-15 | End: 2024-04-15

## 2024-04-15 RX ORDER — TRANEXAMIC ACID 100 MG/ML
1000 INJECTION, SOLUTION INTRAVENOUS ONCE AS NEEDED
Status: DISCONTINUED | OUTPATIENT
Start: 2024-04-15 | End: 2024-04-15

## 2024-04-15 RX ORDER — METOCLOPRAMIDE HYDROCHLORIDE 5 MG/ML
10 INJECTION INTRAMUSCULAR; INTRAVENOUS EVERY 6 HOURS PRN
Status: DISCONTINUED | OUTPATIENT
Start: 2024-04-15 | End: 2024-04-15

## 2024-04-15 RX ORDER — ONDANSETRON HYDROCHLORIDE 2 MG/ML
4 INJECTION, SOLUTION INTRAVENOUS EVERY 6 HOURS PRN
Status: DISCONTINUED | OUTPATIENT
Start: 2024-04-15 | End: 2024-04-15

## 2024-04-15 RX ORDER — MISOPROSTOL 200 UG/1
800 TABLET ORAL ONCE AS NEEDED
Status: DISCONTINUED | OUTPATIENT
Start: 2024-04-15 | End: 2024-04-15

## 2024-04-15 RX ORDER — CARBOPROST TROMETHAMINE 250 UG/ML
250 INJECTION, SOLUTION INTRAMUSCULAR ONCE AS NEEDED
Status: DISCONTINUED | OUTPATIENT
Start: 2024-04-15 | End: 2024-04-18 | Stop reason: HOSPADM

## 2024-04-15 RX ORDER — BISACODYL 10 MG/1
10 SUPPOSITORY RECTAL DAILY PRN
Status: DISCONTINUED | OUTPATIENT
Start: 2024-04-15 | End: 2024-04-18 | Stop reason: HOSPADM

## 2024-04-15 RX ORDER — METHYLERGONOVINE MALEATE 0.2 MG/ML
0.2 INJECTION INTRAVENOUS ONCE AS NEEDED
Status: DISCONTINUED | OUTPATIENT
Start: 2024-04-15 | End: 2024-04-15

## 2024-04-15 RX ORDER — DIPHENHYDRAMINE HYDROCHLORIDE 50 MG/ML
25 INJECTION INTRAMUSCULAR; INTRAVENOUS EVERY 6 HOURS PRN
Status: DISCONTINUED | OUTPATIENT
Start: 2024-04-15 | End: 2024-04-18 | Stop reason: HOSPADM

## 2024-04-15 RX ORDER — SODIUM CHLORIDE, SODIUM LACTATE, POTASSIUM CHLORIDE, CALCIUM CHLORIDE 600; 310; 30; 20 MG/100ML; MG/100ML; MG/100ML; MG/100ML
125 INJECTION, SOLUTION INTRAVENOUS CONTINUOUS
Status: DISCONTINUED | OUTPATIENT
Start: 2024-04-15 | End: 2024-04-15

## 2024-04-15 RX ORDER — NIFEDIPINE 10 MG/1
10 CAPSULE ORAL ONCE AS NEEDED
Status: DISCONTINUED | OUTPATIENT
Start: 2024-04-15 | End: 2024-04-15

## 2024-04-15 RX ORDER — MISOPROSTOL 200 UG/1
800 TABLET ORAL ONCE AS NEEDED
Status: DISCONTINUED | OUTPATIENT
Start: 2024-04-15 | End: 2024-04-18 | Stop reason: HOSPADM

## 2024-04-15 RX ORDER — METOCLOPRAMIDE 10 MG/1
10 TABLET ORAL EVERY 6 HOURS PRN
Status: DISCONTINUED | OUTPATIENT
Start: 2024-04-15 | End: 2024-04-15

## 2024-04-15 RX ORDER — LABETALOL HYDROCHLORIDE 5 MG/ML
20 INJECTION, SOLUTION INTRAVENOUS ONCE AS NEEDED
Status: DISCONTINUED | OUTPATIENT
Start: 2024-04-15 | End: 2024-04-15

## 2024-04-15 RX ORDER — LOPERAMIDE HYDROCHLORIDE 2 MG/1
4 CAPSULE ORAL EVERY 2 HOUR PRN
Status: DISCONTINUED | OUTPATIENT
Start: 2024-04-15 | End: 2024-04-18 | Stop reason: HOSPADM

## 2024-04-15 RX ORDER — HYDRALAZINE HYDROCHLORIDE 20 MG/ML
5 INJECTION INTRAMUSCULAR; INTRAVENOUS ONCE AS NEEDED
Status: DISCONTINUED | OUTPATIENT
Start: 2024-04-15 | End: 2024-04-15

## 2024-04-15 RX ORDER — METHYLERGONOVINE MALEATE 0.2 MG/ML
0.2 INJECTION INTRAVENOUS ONCE AS NEEDED
Status: DISCONTINUED | OUTPATIENT
Start: 2024-04-15 | End: 2024-04-18 | Stop reason: HOSPADM

## 2024-04-15 RX ORDER — FENTANYL/ROPIVACAINE/NS/PF 2MCG/ML-.2
0-25 PLASTIC BAG, INJECTION (ML) INJECTION CONTINUOUS
Status: DISCONTINUED | OUTPATIENT
Start: 2024-04-15 | End: 2024-04-15

## 2024-04-15 RX ADMIN — Medication 2 MILLI-UNITS/MIN: at 12:41

## 2024-04-15 RX ADMIN — PENICILLIN G 3 MILLION UNITS: 3000000 INJECTION, SOLUTION INTRAVENOUS at 15:35

## 2024-04-15 RX ADMIN — PENICILLIN G 3 MILLION UNITS: 3000000 INJECTION, SOLUTION INTRAVENOUS at 19:38

## 2024-04-15 RX ADMIN — Medication 60 MILLI-UNITS/MIN: at 21:04

## 2024-04-15 RX ADMIN — SODIUM CHLORIDE, POTASSIUM CHLORIDE, SODIUM LACTATE AND CALCIUM CHLORIDE 500 ML: 600; 310; 30; 20 INJECTION, SOLUTION INTRAVENOUS at 16:36

## 2024-04-15 RX ADMIN — LIDOCAINE HYDROCHLORIDE 3 ML: 10 INJECTION, SOLUTION INFILTRATION; PERINEURAL at 16:35

## 2024-04-15 RX ADMIN — ACETAMINOPHEN 975 MG: 325 TABLET ORAL at 22:57

## 2024-04-15 RX ADMIN — Medication 10 ML/HR: at 16:50

## 2024-04-15 RX ADMIN — PENICILLIN G POTASSIUM 5 MILLION UNITS: 5000000 INJECTION, POWDER, FOR SOLUTION INTRAMUSCULAR; INTRAVENOUS at 07:40

## 2024-04-15 RX ADMIN — PENICILLIN G 3 MILLION UNITS: 3000000 INJECTION, SOLUTION INTRAVENOUS at 11:36

## 2024-04-15 RX ADMIN — SODIUM CHLORIDE, POTASSIUM CHLORIDE, SODIUM LACTATE AND CALCIUM CHLORIDE 125 ML/HR: 600; 310; 30; 20 INJECTION, SOLUTION INTRAVENOUS at 07:38

## 2024-04-15 RX ADMIN — SODIUM CHLORIDE, POTASSIUM CHLORIDE, SODIUM LACTATE AND CALCIUM CHLORIDE 500 ML: 600; 310; 30; 20 INJECTION, SOLUTION INTRAVENOUS at 19:46

## 2024-04-15 RX ADMIN — IBUPROFEN 600 MG: 600 TABLET ORAL at 22:56

## 2024-04-15 RX ADMIN — SODIUM CHLORIDE, POTASSIUM CHLORIDE, SODIUM LACTATE AND CALCIUM CHLORIDE 125 ML/HR: 600; 310; 30; 20 INJECTION, SOLUTION INTRAVENOUS at 20:46

## 2024-04-15 SDOH — HEALTH STABILITY: MENTAL HEALTH: SUICIDAL BEHAVIOR (LIFETIME): NO

## 2024-04-15 SDOH — HEALTH STABILITY: MENTAL HEALTH: WISH TO BE DEAD (PAST 1 MONTH): NO

## 2024-04-15 SDOH — SOCIAL STABILITY: SOCIAL INSECURITY: HAS ANYONE EVER THREATENED TO HURT YOUR FAMILY OR YOUR PETS?: NO

## 2024-04-15 SDOH — SOCIAL STABILITY: SOCIAL INSECURITY: ARE THERE ANY APPARENT SIGNS OF INJURIES/BEHAVIORS THAT COULD BE RELATED TO ABUSE/NEGLECT?: NO

## 2024-04-15 SDOH — HEALTH STABILITY: MENTAL HEALTH: WERE YOU ABLE TO COMPLETE ALL THE BEHAVIORAL HEALTH SCREENINGS?: YES

## 2024-04-15 SDOH — ECONOMIC STABILITY: HOUSING INSECURITY: DO YOU FEEL UNSAFE GOING BACK TO THE PLACE WHERE YOU ARE LIVING?: NO

## 2024-04-15 SDOH — SOCIAL STABILITY: SOCIAL INSECURITY: DO YOU FEEL ANYONE HAS EXPLOITED OR TAKEN ADVANTAGE OF YOU FINANCIALLY OR OF YOUR PERSONAL PROPERTY?: NO

## 2024-04-15 SDOH — SOCIAL STABILITY: SOCIAL INSECURITY: HAVE YOU HAD THOUGHTS OF HARMING ANYONE ELSE?: NO

## 2024-04-15 SDOH — SOCIAL STABILITY: SOCIAL INSECURITY: ARE YOU OR HAVE YOU BEEN THREATENED OR ABUSED PHYSICALLY, EMOTIONALLY, OR SEXUALLY BY ANYONE?: NO

## 2024-04-15 SDOH — SOCIAL STABILITY: SOCIAL INSECURITY: ABUSE SCREEN: ADULT

## 2024-04-15 SDOH — SOCIAL STABILITY: SOCIAL INSECURITY: DOES ANYONE TRY TO KEEP YOU FROM HAVING/CONTACTING OTHER FRIENDS OR DOING THINGS OUTSIDE YOUR HOME?: NO

## 2024-04-15 SDOH — SOCIAL STABILITY: SOCIAL INSECURITY: VERBAL ABUSE: DENIES

## 2024-04-15 SDOH — HEALTH STABILITY: MENTAL HEALTH: CURRENT SMOKER: 0

## 2024-04-15 SDOH — HEALTH STABILITY: MENTAL HEALTH: NON-SPECIFIC ACTIVE SUICIDAL THOUGHTS (PAST 1 MONTH): NO

## 2024-04-15 SDOH — SOCIAL STABILITY: SOCIAL INSECURITY: PHYSICAL ABUSE: DENIES

## 2024-04-15 ASSESSMENT — PAIN SCALES - GENERAL
PAINLEVEL_OUTOF10: 0 - NO PAIN
PAINLEVEL_OUTOF10: 1
PAINLEVEL_OUTOF10: 6
PAINLEVEL_OUTOF10: 10 - WORST POSSIBLE PAIN
PAINLEVEL_OUTOF10: 6
PAINLEVEL_OUTOF10: 0 - NO PAIN
PAINLEVEL_OUTOF10: 8
PAINLEVEL_OUTOF10: 6
PAINLEVEL_OUTOF10: 0 - NO PAIN
PAINLEVEL_OUTOF10: 6
PAINLEVEL_OUTOF10: 8
PAINLEVEL_OUTOF10: 8
PAINLEVEL_OUTOF10: 0 - NO PAIN

## 2024-04-15 ASSESSMENT — ACTIVITIES OF DAILY LIVING (ADL): LACK_OF_TRANSPORTATION: NO

## 2024-04-15 ASSESSMENT — LIFESTYLE VARIABLES
HOW OFTEN DO YOU HAVE A DRINK CONTAINING ALCOHOL: NEVER
HOW OFTEN DO YOU HAVE 6 OR MORE DRINKS ON ONE OCCASION: NEVER
SKIP TO QUESTIONS 9-10: 1
HOW MANY STANDARD DRINKS CONTAINING ALCOHOL DO YOU HAVE ON A TYPICAL DAY: PATIENT DOES NOT DRINK
AUDIT-C TOTAL SCORE: 0
AUDIT-C TOTAL SCORE: 0

## 2024-04-15 ASSESSMENT — PATIENT HEALTH QUESTIONNAIRE - PHQ9
1. LITTLE INTEREST OR PLEASURE IN DOING THINGS: NOT AT ALL
SUM OF ALL RESPONSES TO PHQ9 QUESTIONS 1 & 2: 0
2. FEELING DOWN, DEPRESSED OR HOPELESS: NOT AT ALL

## 2024-04-15 NOTE — PROGRESS NOTES
[facility] PROGRESS NOTE    Subjective:    Patient is breathing well with contractions.   Partner and mother are supportive and present at the bedside.    Contraction pain is mild and moderate,    Epidural:  undecided at this note time    Objective:   Visit Vitals  /86   Pulse (!) 112   Temp 36.4 °C (97.5 °F)   Resp 18       FHR:      Baseline 140  Variability; moderate  Accels; Reactive  Decels none                                      Contraction Frequency:  irregular, every 3-5 minutes    Cervical Exam: deferred    Membranes:  are ruptured    Pitocin:  Yes  6milliunits/minute    Assessment:  Pia Olivo is a 19 y.o.  at 38w5d  PROM - SROM at 0600 this AM for clear fluid. Remains afebrile and asymptomatic.   latent Labor  Fetal Heart Rate Category 1 - overall reassuring  GBS: pos - adequately treated    Plan:  Continue to increase pitocin per unit protocol until adequate contraction pattern achieved  Limit cervical checks d/t PROM  Activity as tolerated  Clear liquids  Encouraged to rest  Frequent position changes  Continuous fetal monitoring  Continue pitocin and titrate as needed to achieve adequate contraction pattern  Penicillin for GBS prophylaxis  Re-evaluate in 2-4 hours or as needed  Reviewed with Dr Orr who agreed with plan of care  Anticipate continued cervical change and onset of active labor and SVB    Electronically signed by LYN Alexander on 4/15/2024 at 2:50 PM

## 2024-04-15 NOTE — NURSING NOTE
After multiple attempts at troubleshooting FELIPA, can not get a continuous tracing. External monitors applied. FELIPA removed.

## 2024-04-15 NOTE — ANESTHESIA PREPROCEDURE EVALUATION
Patient: Pia Olivo    Evaluation Method: In-person visit    Procedure Information    Date: 04/15/24  Procedure: Labor Consult         Relevant Problems   Anesthesia (within normal limits)      ID   (+) Group B streptococcal infection during pregnancy ([facility])      GYN   (+) Encounter for supervision of normal pregnancy in multigravida in third trimester ([facility])       Clinical information reviewed:   Tobacco  Allergies  Meds  Problems  Med Hx  Surg Hx   Fam Hx  Soc   Hx        NPO Detail:  Currently eating.      OB/Gyn Evaluation    Present Pregnancy    Patient is pregnant now.   Obstetric History            Physical Exam    Airway  Mallampati: II  TM distance: >3 FB  Neck ROM: full     Cardiovascular - normal exam  Rhythm: regular     Dental - normal exam     Pulmonary - normal exam     Abdominal          Anesthesia Plan    History of general anesthesia?: no  History of complications of general anesthesia?: no    ASA 2     epidural   (I informed and discussed the risks and benefits of general, spinal and epidural anesthesia with the patient.  The patient expressed her understanding and her questions were answered.  A verbal consent was given by the patient.   )  The patient is not a current smoker.    Anesthetic plan and risks discussed with patient.  Use of blood products discussed with patient who consented to blood products.    Plan discussed with CRNA.

## 2024-04-15 NOTE — ANESTHESIA PROCEDURE NOTES
Epidural Block    Patient location during procedure: OB  Start time: 4/15/2024 4:32 PM  End time: 4/15/2024 4:45 PM  Reason for block: labor analgesia  Staffing  Performed: CRNA   Authorized by: SALVADOR Rosales DNP    Performed by: SALVADOR Rosales DNP    Preanesthetic Checklist  Completed: patient identified, IV checked, risks and benefits discussed, surgical consent, pre-op evaluation, timeout performed and sterile techniques followed  Block Timeout  RN/Licensed healthcare professional reads aloud to the Anesthesia provider and entire team: Patient identity, procedure with side and site, patient position, and as applicable the availability of implants/special equipment/special requirements.  Patient on coagulant treatment: no  Timeout performed at: 4/15/2024 4:33 PM  Block Placement  Patient position: sitting  Prep: ChloraPrep  Sterility prep: cap, drape, gloves, hand and mask  Sedation level: no sedation  Patient monitoring: continuous pulse oximetry and blood pressure  Approach: midline  Local numbing: lidocaine 1% to skin and subcutaneous tissues  Vertebral space: lumbar  Lumbar location: L3-L4  Epidural  Loss of resistance technique: saline  Guidance: landmark technique        Needle  Needle type: Tuohy   Needle gauge: 17  Needle insertion depth: 5 cm  Catheter type: end hole  Catheter at skin depth: 10 cm  Catheter securement method: clear occlusive dressing, liquid medical adhesive and surgical tape    Test dose: lidocaine 1.5% with epinephrine 1-to-200,000  Test dose: lidocaine 1.5% with epinephrine 1-to-200,000  Test dose result: no positive test dose    PCEA  Medication concentration used: 0.2% Ropivacaine with 2 mcg/mL Fentanyl  Dose (mL): 5  Lockout (minutes): 30  1-Hour Limit (boluses/hr): 2  Basal Rate: 10        Assessment  Number of attempts: 1  Events: no positive test dose  Procedure assessment: patient tolerated procedure well with no immediate complications  Additional  Notes  Easy placement, single pass. 1644: 2ml 1.5% lido w 1:200k epi given per epidural. 1650: 5ml clinician bolus given per epidural. 1705: Left T10 and left T12 assessed. 1710: Additional 2ml clinician bolus given per epidural. 1725: Pt comfortable with contractions. Left T9 and right T11 assessed.

## 2024-04-15 NOTE — PROGRESS NOTES
Cervix unchanged since admission.   Reviewed recommendation for pitocin augmentation.   Patient would like to think about it for 20 minutes and will let us know what she decides.   Cat I with intermittent monitoring. Remains afebrile and asymptomatic.   Reviewed with Dr. Daniels who is in agreement with POC.   Plan to start pitocin AOL when /if patient is agreeable.   Expectant management

## 2024-04-15 NOTE — PROGRESS NOTES
Intrapartum Progress Note    Assessment/Plan   Pia Olivo is a 19 y.o.  at 38w5d. DA: 2024, by Ultrasound.     Pregnancy c/b:   -GBS infection     A:  -active phase of labor  -FHT CAT 2, overall reassuring  -GBS positive  -Rh positive   -Rubella immune     P:  -continue to monitor maternal/fetal status  -cEFM   -IV fluid bolus for maternal/fetal tachycardia    -continue pitocin for labor augmentation  -continue epidural   -anticipate      OB attending Dr. Ruiz aware of status     LILIANE NICHOLSON   Pregnancy Problems (from 23 to present)       Problem Noted Resolved    Encounter for supervision of normal pregnancy in multigravida in third trimester ([facility]) 4/15/2024 by LYN Samuel No    Priority:  Medium      Pregnancy with 38 completed weeks gestation ([facility]) 4/15/2024 by LYN Samuel No    Priority:  Medium      Group B streptococcal infection during pregnancy ([facility]) 4/15/2024 by LYN Samuel No    Priority:  Medium              Subjective   Pt is comfortable with epidural and other than feeling shaky, has no complaints or concerns. Family is supportive at bedside. Pt is agreeable to SVE.     Following SVE, pt placed in throne position     Objective   Last Vitals:  Temp Pulse Resp BP MAP Pulse Ox   36.2 °C (97.2 °F) (!) 128 18 132/83   96 %     Vitals Min/Max Last 24 Hours:  Temp  Min: 36.2 °C (97.2 °F)  Max: 36.6 °C (97.9 °F)  Pulse  Min: 75  Max: 128  Resp  Min: 18  Max: 20  BP  Min: 126/73  Max: 145/88    Intake/Output:  No intake or output data in the 24 hours ending 04/15/24 1932    Physical Examination:  GENERAL: Examination reveals a well developed, well nourished, gravid female in no acute distress. She is alert and cooperative.  HEENT: PERRLA. External ears normal. Nose normal, no erythema or discharge. Mouth and throat clear.  NECK: supple, no significant adenopathy  LUNGS: clear to auscultation bilaterally  HEART:   tachycardia with S1 and S2 sounds, no gallops or murmurs   BREASTS: breasts appear normal for pregnancy, no suspicious masses, no skin or nipple changes or axillary nodes  ABDOMEN: soft, gravid, nontender, nondistended, no abnormal masses, no epigastric pain  FHR is 160 , with intermittent minimal and moderate variability and accels; currently no decels or sinusoidal patterns. CAT 2 tracing   Reedy reading:  Contractions every 1.5 - 3 minutes, duration of 40 - 70 seconds, moderate to strong contractile intensity and soft uterine tone   The fetus is in a vertex presentation, determined by vaginal exam  VAGINA: normal appearing vagina with normal color and discharge and no lesions noted  CERVIX: Lip/rim (Comment) cm dilated, 100 % effaced, 1 station; MEMBRANES are SROM - clear fluid   EXTREMITIES: no redness or tenderness in the calves or thighs, no edema  SKIN: normal coloration and turgor, no rashes  NEUROLOGICAL: alert, oriented, normal speech, no focal findings or movement disorder noted  PSYCHOLOGICAL: awake and alert; oriented to person, place, and time    Lab Review:  Labs in chart were reviewed.

## 2024-04-15 NOTE — PROGRESS NOTES
[facility] LABOR PROGRESS NOTE    Subjective:    Patient is doing well with contractions.   Partner is supportive and present at the bedside.    Contraction pain is none,   Epidural:  pt is s/p epidural start    Objective:   Visit Vitals  /87   Pulse 79   Temp 36.3 °C (97.3 °F)   Resp 18       FHR:      Baseline 150  Variability; moderate  Accels; Reactive  Decels none                                      Contraction Frequency:  regular, every 2-3 minutes    Cervical Exam: deferred    Membranes:  are ruptured    Pitocin:  Yes  10milliunits/minute    Assessment:  Pia Olivo is a 19 y.o.  at 38w5d  PROM at 0600 this AM, for clear fluid  Remains afebrile and asymptomatic  latent Labor moving toward active  Fetal Heart Rate Category 1 - overall reassuring  GBS: pos - adequately treated    Plan:  Patient encouraged to rest now that she is comfortable  Activity as tolerated  Clear liquids  Encouraged to rest  Frequent position changes  Continuous fetal monitoring  Continue pitocin and titrate as needed to achieve adequate contraction pattern  Continue epidural infusion as managed by anesthesia dept  Penicillin for GBS prophylaxis  Re-evaluate in 2-4 hours or as needed  Dr. Orr remains in house and available PRN  Anticipate continued cervical change and onset of active labor and SVB    Electronically signed by LYN Alexander on 4/15/2024 at 6:27 PM

## 2024-04-15 NOTE — ANESTHESIA PREPROCEDURE EVALUATION
Patient: Pia Olivo    Evaluation Method: In-person visit    Procedure Information    Anesthesia Start Date/Time: 04/15/24 1632  Procedure: Labor Analgesia     Pt  at 38.5 weeks, admitted with SROM    Relevant Problems   ID   (+) Group B streptococcal infection during pregnancy ([facility])      GYN   (+) Encounter for supervision of normal pregnancy in multigravida in third trimester ([facility])       Clinical information reviewed:   Tobacco  Allergies  Meds  Problems  Med Hx  Surg Hx   Fam Hx  Soc   Hx        NPO Detail:  No data recorded     OB/GYN     Physical Exam    Airway  Mallampati: II  TM distance: >3 FB  Neck ROM: full     Cardiovascular    Dental        Pulmonary    Abdominal        Anesthesia Plan    History of general anesthesia?: no  History of complications of general anesthesia?: unknown/emergency    ASA 2     epidural     The patient is not a current smoker.    Anesthetic plan and risks discussed with patient.  Use of blood products discussed with patient who consented to blood products.

## 2024-04-15 NOTE — PROGRESS NOTES
[facility] PROGRESS NOTE    Subjective:    Patient is breathing well with contractions.   Partner and her mother are supportive and present at the bedside.    Contraction pain is mild,   Epidural:  planning NCB, but may need an epidural, patient will let us know    Objective:   Visit Vitals  /82   Pulse 90   Temp 36.5 °C (97.7 °F)   Resp 18       FHR:      Baseline 150  Variability; moderate  Accels; Reactive  Decels none                                      Contraction Frequency:  irregular, every 3-5  minutes per patient    Cervical Exam: deferred    Membranes:  are ruptured, clear fluid at 0600 this am    Pitocin:  No      Assessment:  Pia Olivo is a 19 y.o.  at 38w5d  PROM - SROM clear fluid at 0600 this am - plan for AOL at 6 hours post rupture is active labor has not started. Limit cervical exams  latent Labor  Fetal Heart Rate Category 1 - overall reassuring  GBS: pos received first dose at 0740 will be adequately treated at 1140    Plan:  Consider pitocin AOL if minimal cervical change noted after exam at 1200  Limit cervical checks d/t SROM  Patient encouraged to maintain upright positions and ambulate in room.   Activity as tolerated  Clear liquids  Encouraged to rest  Frequent position changes  intermittent fetal monitoring  Penicillin for GBS prophylaxis  Re-evaluate in 2-4 hours or as needed  Expectant management  Reviewed with Dr Daniels who agreed with plan of care    Electronically signed by LYN Alexander on 4/15/2024 at 10:03 AM

## 2024-04-15 NOTE — H&P
Addendum by oncoming [facility]:  Reactive tracing.   Reviewed option for augmentation on arrival, or waiting approx 6 hours after ROM for spontaneous labor to start. Patient prefers to wait for labor to start. Will plan pitocin AOL at 1200 if active labor has not yet started.   Plan to minimize cervical checks d/t ROM  GBS ppx in progress  Declining LARC  Vertex by US  Anticipate SVB    Obstetrical Admission History and Physical     Pia ANGELICA Olivo is a 19 y.o.  at 38w5d. DA: 2024, by Ultrasound. Estimated fetal weight: 7#. She has had prenatal care with Dr. Becerra .    Chief Complaint: Contractions and Leakage/Loss of Fluid    Assessment/Plan      Pregnancy c/b:   -GBS infection  -note in PN chart r/t food insecurity, PP SW consult PRN     A:   -Admission for SROM, clear fluid   -FHT appears to be CAT 1 with limited timing in triage  -GBS positive  -Rh positive  -Rubella immune     P:  -admission, routine labs  -following reactive NST, intermittent fetal monitoring per protocol and cEFM otherwise  -PCN for GBS prophylaxis   -pain control PRN  -anticipate  pending vertex confirmation     OB attending and oncoming CNMISAEL Combs updated re: arrival, SVE, RF, plan, and need for US/consent    LILIANE NICHOLSON        Pregnancy Problems (from 21 to present)       Problem Noted Resolved    Encounter for supervision of normal pregnancy in multigravida in third trimester ([facility]) 4/15/2024 by LYN Samuel No    Priority:  Medium      Pregnancy with 38 completed weeks gestation ([facility]) 4/15/2024 by LYN Samuel No    Priority:  Medium      Group B streptococcal infection during pregnancy ([facility]) 4/15/2024 by LYN Samuel No    Priority:  Medium            Options for delivery have been discussed with the patient and she elects a vaginal delivery.  Labor has been discussed in detail. The risks, benefits, complications, alternatives, expected  outcomes, potential problems during recuperation and recovery, and the risks of not performing the procedure were discussed with the patient. The patient stated understanding that the risks of delivery include, but are not limited to: death; reaction to medications; injury to bowel, bladder, ureters, uterus, cervix, vagina, and other pelvic and abdominal structures, infection; blood loss and possible need for transfusion; and potential need for surgery, including hysterectomy. The risks of injury to the infant during delivery were also discussed. All questions were answered. The patient is wishing to continue with plans for a vaginal delivery.    Admit to inpatient status. I anticipate that this patient will require a stay exceeding at least 2 midnights for delivery and postpartum.  Active management of rupture of membranes.  Management of pregnancy complications, as indicated.    Melinda Palacio presents to Labor & Delivery with her partner to [facility] L&D triage for c/o gush of fluid around 0600 and contractions since that are becoming painful. She endorses regular fetal movement and denies HA, blurred vision, chest or RUQ pain, urinary complaints, vaginal bleeding, or edema.          Obstetrical History   OB History    Para Term  AB Living   2 0     1     SAB IAB Ectopic Multiple Live Births     1            # Outcome Date GA Lbr Chivo/2nd Weight Sex Delivery Anes PTL Lv   2 Current            1 IAB                Past Medical History  History reviewed. No pertinent past medical history.     Past Surgical History   History reviewed. No pertinent surgical history.    Social History  Social History     Tobacco Use    Smoking status: Never    Smokeless tobacco: Never   Substance Use Topics    Alcohol use: Never     Substance and Sexual Activity   Drug Use Never       Allergies  Patient has no known allergies.     Medications  Medications Prior to Admission   Medication Sig Dispense Refill Last Dose     "Prenatal Vitamin Plus Low Iron 27 mg iron- 1 mg tablet Take 1 tablet by mouth once daily.          Objective    Last Vitals  Temp Pulse Resp BP MAP O2 Sat     94   132/83   100 %     Physical Examination  GENERAL: Examination reveals a well developed, well nourished, gravid female in no acute distress. She is alert and cooperative.  HEENT: PERRLA. External ears normal. Nose normal, no erythema or discharge. Mouth and throat clear.  NECK: supple, no significant adenopathy  LUNGS:  easy respiratory effort   BREASTS: breasts appear normal for pregnancy, no suspicious masses, no skin or nipple changes or axillary nodes  ABDOMEN: soft, gravid, nontender, nondistended, no abnormal masses, no epigastric pain  FHR is 140 with moderate variability and accels; no decels or sinusoidal patterns.  Limited tracing of <10 minutes.   [place] reading: Contractions every 3 - 5 minutes per palpation, duration approximately 40 - 60 seconds, mild to moderate contractile intensity and soft uterine resting tone.    The fetus is in a vertex presentation, determined by vaginal exam - oncoming CNM aware pt needs US   Current Estimated Fetal Weight 7# established by Leopold's maneuver  VAGINA: normal appearing vagina with normal color and discharge and no lesions noted  CERVIX: 3 cm dilated, 80 % effaced, -1 station, soft and posterior; MEMBRANES are  grossly ruptured for clear fluid, +nitrazine   EXTREMITIES: no redness or tenderness in the calves or thighs, no edema  SKIN: normal coloration and turgor, no rashes  NEUROLOGICAL: alert, oriented, normal speech, no focal findings or movement disorder noted  PSYCHOLOGICAL: awake and alert; oriented to person, place, and time    Lab Review  Labs in chart were reviewed including type & screen, GBS, most recent CBC, and GDM testing   No results found for: \"ABO\", \"LABRH\", \"ABSCRN\"  No results found for: \"WBC\", \"HGB\", \"HCT\", \"PLT\"  No results found for: \"GRPBSTREP\"  No results found for: \"GLUCOSE\", " "\"NA\", \"K\", \"CL\", \"CO2\", \"ANIONGAP\", \"BUN\", \"CREATININE\", \"EGFR\", \"CALCIUM\", \"ALBUMIN\", \"PROT\", \"ALKPHOS\", \"ALT\", \"AST\", \"BILITOT\"  No results found for: \"UTPCR\"    "

## 2024-04-16 ENCOUNTER — APPOINTMENT (OUTPATIENT)
Dept: OBSTETRICS AND GYNECOLOGY | Facility: CLINIC | Age: 19
End: 2024-04-16
Payer: COMMERCIAL

## 2024-04-16 ENCOUNTER — PHARMACY VISIT (OUTPATIENT)
Dept: PHARMACY | Facility: CLINIC | Age: 19
End: 2024-04-16
Payer: MEDICAID

## 2024-04-16 PROCEDURE — 7100000016 HC LABOR RECOVERY PER HOUR

## 2024-04-16 PROCEDURE — RXMED WILLOW AMBULATORY MEDICATION CHARGE

## 2024-04-16 PROCEDURE — 1100000001 HC PRIVATE ROOM DAILY

## 2024-04-16 PROCEDURE — 7210000002 HC LABOR PER HOUR

## 2024-04-16 PROCEDURE — 2500000001 HC RX 250 WO HCPCS SELF ADMINISTERED DRUGS (ALT 637 FOR MEDICARE OP): Performed by: MIDWIFE

## 2024-04-16 PROCEDURE — 59050 FETAL MONITOR W/REPORT: CPT

## 2024-04-16 RX ORDER — IBUPROFEN 600 MG/1
600 TABLET ORAL EVERY 6 HOURS PRN
Qty: 30 TABLET | Refills: 0 | Status: SHIPPED | OUTPATIENT
Start: 2024-04-16

## 2024-04-16 RX ORDER — ACETAMINOPHEN 500 MG
1000 TABLET ORAL EVERY 6 HOURS PRN
Qty: 60 TABLET | Refills: 0 | Status: SHIPPED | OUTPATIENT
Start: 2024-04-16

## 2024-04-16 RX ADMIN — ACETAMINOPHEN 975 MG: 325 TABLET ORAL at 16:48

## 2024-04-16 RX ADMIN — ACETAMINOPHEN 975 MG: 325 TABLET ORAL at 22:01

## 2024-04-16 RX ADMIN — ACETAMINOPHEN 975 MG: 325 TABLET ORAL at 04:42

## 2024-04-16 RX ADMIN — IBUPROFEN 600 MG: 600 TABLET ORAL at 16:48

## 2024-04-16 RX ADMIN — IBUPROFEN 600 MG: 600 TABLET ORAL at 22:01

## 2024-04-16 RX ADMIN — ACETAMINOPHEN 975 MG: 325 TABLET ORAL at 11:07

## 2024-04-16 RX ADMIN — IBUPROFEN 600 MG: 600 TABLET ORAL at 11:07

## 2024-04-16 RX ADMIN — IBUPROFEN 600 MG: 600 TABLET ORAL at 04:42

## 2024-04-16 ASSESSMENT — PAIN - FUNCTIONAL ASSESSMENT
PAIN_FUNCTIONAL_ASSESSMENT: 0-10
PAIN_FUNCTIONAL_ASSESSMENT: 0-10

## 2024-04-16 ASSESSMENT — PAIN DESCRIPTION - LOCATION
LOCATION: ABDOMEN
LOCATION: ABDOMEN

## 2024-04-16 ASSESSMENT — PAIN SCALES - GENERAL
PAINLEVEL_OUTOF10: 1
PAINLEVEL_OUTOF10: 2
PAINLEVEL_OUTOF10: 0 - NO PAIN

## 2024-04-16 NOTE — PROGRESS NOTES
Postpartum Progress Note:    Subjective   19 y.o.  on day 1 postpartum  Feels well & happy  Breastfeeding well   Pain: denies  Perineum: intact  Void, flatus    Objective    Last Vitals  Temp Pulse Resp BP MAP O2 Sat   (!) 35.8 °C (96.5 °F) 68 18 133/86   98 %     breasts soft, nontender, no s/s  nipple trauma  abdomen non-distended  fundus firm, u/1  Perineum intact  Lochia scant to mod    Assessment/Plan   Pia ANGELICA Olivo is a 19 y.o., , who delivered at 38w5d gestation and is now postpartum day 1.  Continue routine postpartum care  Pain well controlled on PO medications  Patient has been assessed to have a DVT risk score of 3, prophylactic lovenox not indicated  Patient is Rh Positive (Rh+).  Encouraged breastfeeding, lactation consult as needed  Contraception methods discussed, plans to discuss at postpartum visit  Bonding well with baby  Normal recovery & involution to date. Appropriate for discharge on PPD #2 if remains stable  Advised patient to follow up in 4-6 weeks with primary OB provider for routine postpartum visit    RAKAN Parker-[facility]

## 2024-04-16 NOTE — L&D DELIVERY NOTE
OB Delivery Note  4/15/2024  Pia ANGELICA Olivo  19 y.o.   Vaginal, Spontaneous        Gestational Age: 38w5d  /Para:   Quantitative Blood Loss: Admission to Discharge: 0 mL (4/15/2024  6:27 AM - 4/15/2024  8:51 PM)    Kit Olivo [[id number]]      Labor Events    Rupture date/time: 4/15/2024 0600  Rupture type: Spontaneous  Fluid color: Clear  Fluid odor: None  Labor type: Spontaneous Onset of Labor  Labor allowed to proceed with plans for an attempted vaginal birth?: Yes  Augmentation: Oxytocin  Augmentation indications: Ineffective Contraction Pattern  Complications: None       Placenta    Placenta delivery date/time:   Placenta removal: Spontaneous  Placenta appearance: Intact  Placenta disposition: discarded       Cord    Vessels: 3 vessels  Complications: Wrapped  Cord around: shoulders  Number of loops: 1  Delayed cord clamping?: Yes  Cord blood disposition: Refrigerator  Gases sent?: No  Cord comments: loose cord wrapped around shoulders, easily untangled prior to skin to skin on mother's chest  Stem cell collection (by provider): No       Lacerations    Episiotomy: None  Perineal laceration: None  Other lacerations?: No  Repair suture: None       Anesthesia    Method: Epidural       Operative Delivery    Forceps attempted?: No  Vacuum extractor attempted?: No       Shoulder Dystocia    Shoulder dystocia present?: No       [place] Delivery    Birth date/time: 4/[birth year]/2024 20:29:00  Delivery type: Vaginal, Spontaneous  Complications: None       Apgars    Living status: Living  Apgar Component Scores:  1 min.:  5 min.:  10 min.:  15 min.:  20 min.:    Skin color:  1  1       Heart rate:  2  2       Reflex irritability:  2  2       Muscle tone:  2  2       Respiratory effort:  2  2       Total:  9  9       Apgars assigned by: ISMA PATEL RN       Delivery Providers    Delivering clinician: LYN Samuel   Provider Role     Delivery Nurse     Nursery Nurse     Resident             ARNALDO  confirmed that pt was completed and +1 station, pushing commenced. She was able to quickly push to , NB delivered with assistance from FOB who held the baby while loose cord around shoulders was reduced and then he placed NB onto mother's chest for skin to skin and delayed cord clamping. Umbilical cord clamped by CNM and cut by FOB. Active third stage management with PP pitocin and gentle continuous downward traction until spontaneous delivery of an intact placenta occurred. Perineum was inspected and a small hemostatic LEFT periurethral abrasion was noted but otherwise intact. Fundus firm and 3FW below umbilicus with scant vaginal bleeding. Pt and family had no questions and show evidence of bonding, pt left in stable condition with family and primary RN at bedside. OB attending Dr. Ruiz updated.     Amanda Bronson, APRN-CNM    Added entry [id number]:  Pt meets criteria for gestational hypertension diagnosis. Dx added to problem list. Baseline CMP sent. Pt denies HA, blurred vision, chest or RUQ pain.     LILIANE Bronson APRN-CNM

## 2024-04-16 NOTE — ANESTHESIA POSTPROCEDURE EVALUATION
Patient: Pia Olivo    Procedure Summary       Date: 04/15/24 Room / Location:     Anesthesia Start: 1632 Anesthesia Stop: 2029    Procedure: Labor Analgesia Diagnosis:     Scheduled Providers:  Responsible Provider: SALVADOR Rosales, YVETTE    Anesthesia Type: epidural ASA Status: 2            Anesthesia Type: epidural    /78 (BP Location: Right arm, Patient Position: Sitting)   Pulse 72   Temp 36.3 °C (97.3 °F) (Temporal)   Resp 18   LMP 06/27/2023   SpO2 98%   Breastfeeding Yes        Anesthesia Post Evaluation    Patient location during evaluation: bedside  Patient participation: complete - patient participated  Level of consciousness: awake and alert  Pain management: adequate  Airway patency: patent  Cardiovascular status: acceptable  Respiratory status: acceptable  Hydration status: acceptable  Postoperative Nausea and Vomiting: none  Comments: Epidural catheter removed by nursing. No redness, swelling, or drainage at puncture site.    Complete resolution of numbness. Patient is able to lift legs, bend at the knees, and ambulate.    Patient denies problems with urination.    Patient denies nausea, headache or severe back pain.         There were no known notable events for this encounter.

## 2024-04-16 NOTE — LACTATION NOTE
Lactation Consultant Note  Lactation Consultation  Reason for Consult: Initial assessment  Consultant Name: Idalia LACEY    Maternal Information  Has mother  before?: No  Infant to breast within first 2 hours of birth?: Yes  Exclusive Pump and Bottle Feed: No    Maternal Assessment  Breast Assessment: Large, Soft, Compressible  Nipple Assessment: Intact, Erect  [place] Assessment: Normal    Infant Assessment  Infant Behavior: Awake  Infant Assessment:  (feeding)    Feeding Assessment  Nutrition Source: Breastmilk  Feeding Method: Nursing at the breast  Feeding Position: Cradle, Football/seated  Suck/Feeding: Sustained, Audible swallowing  Latch Assessment: Minimal assistance is needed, Deep latch obtained, Sucking and swallowing, Sucks with long jaw movement    LATCH TOOL  Latch: Grasps breast, tongue down, lips flanged, rhythmic sucking  Audible Swallowing: Spontaneous and intermittent (24 hours old)  Type of Nipple: Everted (After stimulation)  Comfort (Breast/Nipple): Soft/non-tender  Hold (Positioning): Minimal assist, teach one side, mother does other, staff holds  LATCH Score: 9    Breast Pump       Other OB Lactation Tools       Patient Follow-up  Inpatient Lactation Follow-up Needed :  (As needed)  Outpatient Lactation Follow-up:  (As needed - information provided)    Other OB Lactation Documentation       Recommendations/Summary  Mom was feeding baby at the time of my visit. Baby was positioned well and was sucking withj long jaw movement. Baby seemed to be latched a bit shallow. I showed mom how to detach baby and reposition in football hold. We worked on achieving a deep latch as well as hand placement for support of baby and the breast during feeds. Baby latched readily and fed well sucking a with log jaw movement. Mom denies pain with feeds. Reviewed milk supply patterns and  feeding patterns in the fist and second 24 HOL.Mom was asked to attempt/feed baby at least every 2-3 hours or on  demand with a goal of 8-12 feeds daily. Feeding cues reviewed.Breastfeeding education reviewed and questions answered. Mom aware of lactation support and asked to call out for feed assistance and with questions as needed.

## 2024-04-16 NOTE — PROGRESS NOTES
Social Work Brief Note     Patient: Pia Olivo      Reason for Visit: Support      met with parents bedside to provide support visit. Ms. Olivo states that she is feeling well following the birth of her first baby a daughter born on 4/15/24. SW discussed PPD and Safe Sleep with parents. Information provided. SW offered Help Me Grow program and they are interested. Referral sent. Ms. Olivo is on WIC and is aware to add baby to insurance within 30 days. SW to remain available if needed.       Signature: KAREN Soria

## 2024-04-17 ENCOUNTER — PHARMACY VISIT (OUTPATIENT)
Dept: PHARMACY | Facility: CLINIC | Age: 19
End: 2024-04-17
Payer: MEDICAID

## 2024-04-17 PROBLEM — Z34.90 ENCOUNTER FOR PLANNED INDUCTION OF LABOR (HHS-HCC): Status: ACTIVE | Noted: 2024-04-17

## 2024-04-17 PROBLEM — Z34.83: Status: RESOLVED | Noted: 2024-04-15 | Resolved: 2024-04-17

## 2024-04-17 PROBLEM — Z37.9 NORMAL LABOR (HHS-HCC): Status: RESOLVED | Noted: 2024-04-17 | Resolved: 2024-04-17

## 2024-04-17 PROBLEM — Z34.90 ENCOUNTER FOR PLANNED INDUCTION OF LABOR (HHS-HCC): Status: RESOLVED | Noted: 2024-04-17 | Resolved: 2024-04-17

## 2024-04-17 PROBLEM — Z3A.38 PREGNANCY WITH 38 COMPLETED WEEKS GESTATION (HHS-HCC): Status: RESOLVED | Noted: 2024-04-15 | Resolved: 2024-04-17

## 2024-04-17 PROCEDURE — RXMED WILLOW AMBULATORY MEDICATION CHARGE

## 2024-04-17 PROCEDURE — 1100000001 HC PRIVATE ROOM DAILY

## 2024-04-17 PROCEDURE — 99232 SBSQ HOSP IP/OBS MODERATE 35: CPT | Performed by: ADVANCED PRACTICE MIDWIFE

## 2024-04-17 PROCEDURE — 2500000001 HC RX 250 WO HCPCS SELF ADMINISTERED DRUGS (ALT 637 FOR MEDICARE OP): Performed by: MIDWIFE

## 2024-04-17 RX ORDER — DOCUSATE SODIUM 100 MG/1
100 CAPSULE, LIQUID FILLED ORAL 2 TIMES DAILY PRN
Qty: 30 CAPSULE | Refills: 5 | Status: SHIPPED | OUTPATIENT
Start: 2024-04-17

## 2024-04-17 RX ORDER — ACETAMINOPHEN 500 MG
1 TABLET ORAL 2 TIMES DAILY
Qty: 1 EACH | Refills: 0 | Status: SHIPPED | OUTPATIENT
Start: 2024-04-17

## 2024-04-17 RX ADMIN — IBUPROFEN 600 MG: 600 TABLET ORAL at 04:06

## 2024-04-17 RX ADMIN — ACETAMINOPHEN 975 MG: 325 TABLET ORAL at 11:30

## 2024-04-17 RX ADMIN — IBUPROFEN 600 MG: 600 TABLET ORAL at 10:45

## 2024-04-17 RX ADMIN — ACETAMINOPHEN 975 MG: 325 TABLET ORAL at 23:13

## 2024-04-17 RX ADMIN — ACETAMINOPHEN 975 MG: 325 TABLET ORAL at 04:05

## 2024-04-17 RX ADMIN — IBUPROFEN 600 MG: 600 TABLET ORAL at 23:12

## 2024-04-17 RX ADMIN — ACETAMINOPHEN 975 MG: 325 TABLET ORAL at 17:37

## 2024-04-17 RX ADMIN — IBUPROFEN 600 MG: 600 TABLET ORAL at 17:37

## 2024-04-17 ASSESSMENT — PAIN SCALES - GENERAL
PAINLEVEL_OUTOF10: 2
PAINLEVEL_OUTOF10: 0 - NO PAIN

## 2024-04-17 NOTE — CARE PLAN
The patient's goals for the shift include free from injury    The clinical goals for the shift include maternal and infant bonding      Problem: Pain - Adult  Goal: Verbalizes/displays adequate comfort level or baseline comfort level  Outcome: Progressing     Problem: Safety - Adult  Goal: Free from fall injury  Outcome: Progressing     Problem: Postpartum  Goal: Experiences normal postpartum course  Outcome: Progressing  Goal: Appropriate maternal -  bonding  Outcome: Progressing  Goal: Establish and maintain infant feeding pattern for adequate nutrition  Outcome: Progressing  Goal: No s/sx infection  Outcome: Progressing  Goal: No s/sx of hemorrhage  Outcome: Progressing  Goal: Minimal s/sx of HDP and BP<160/110  Outcome: Progressing     Problem: Discharge Planning  Goal: Discharge to home or other facility with appropriate resources  Outcome: Progressing

## 2024-04-17 NOTE — PROGRESS NOTES
Postpartum Progress Note    Assessment/Plan   Pia Olivo is a 19 y.o., , who delivered at 38w5d gestation after SROM at home, labor augmented .     A:  1) Postpartum day 2  2) Normal vaginal birth with gestational hypertension dx during hospital stay  3) Breastfeeding successfully  4) Activity ad gage well tolerated    P:  1) Reviewed self care, breastfeeding, postpartum plan of care  2) Pain well-controlled  3) Birth control plan: declines  4) RTC  2-5 days wks for PP blood pressure check   5) Discharge : tomorrow    Patient is happy with birth experience and hospital stay.    Principal Problem:    Group B streptococcal infection during pregnancy ([facility])  Active Problems:     (spontaneous vaginal delivery) ([facility])    Gestational hypertension without significant proteinuria, postpartum ([facility])    Single liveborn infant delivered vaginally ([facility])    Pregnancy Problems (from 21 to present)       Problem Noted Resolved    Group B streptococcal infection during pregnancy ([facility]) 4/15/2024 by LYN Samuel No    Priority:  Medium      Gestational hypertension without significant proteinuria, postpartum ([facility]) 4/15/2024 by LYN Samuel No    Priority:  Medium        2024 by LYN Magaña          Encounter for supervision of normal pregnancy in multigravida in third trimester ([facility]) 4/15/2024 by LYN Samuel 2024 by LYN Magaña    Priority:  Medium      Pregnancy with 38 completed weeks gestation ([facility]) 4/15/2024 by LYN Samuel 2024 by LYN Magaña    Priority:  Medium            Hospital course: gestational hypertension      Subjective   Her pain is well controlled with current medications  She is passing flatus  She is ambulating well  She is tolerating a Adult diet Regular  She reports no breast or nursing problems  She denies emotional concerns today   Her plan  for contraception - declines today    Objective   Allergies:   Patient has no known allergies.         Last Vitals:  Temp Pulse Resp BP MAP Pulse Ox   36.5 °C (97.7 °F) 75 16 (!) 138/96 (RN notfied)   98 %     Vitals Min/Max Last 24 Hours:  Temp  Min: 36.5 °C (97.7 °F)  Max: 36.8 °C (98.2 °F)  Pulse  Min: 71  Max: 92  Resp  Min: 16  Max: 18  BP  Min: 99/60  Max: 138/96    Intake/Output:   No intake or output data in the 24 hours ending 04/17/24 1129    Physical Exam:  Constitutional: alert, oriented  Postpartum:  Fundus firm at U-2, light lochia rubra, perineum healing as expected  Breast:  Filling, nipples intact, breastfeeding successfully  Head/Neck: neck supple, no thyromegaly  Respiratory/Thorax: normal respiratory effort  Cardiovascular: RRR  Genitourinary: no CVAT, no suprapubic tenderness  Musculoskeletal: Full ROM  Extremities: no calf tenderness, reflexes 2+  Neurological: Oriented x3  Breast: no masses  Psychological: appropriate affect, family bonding evident  Skin: no rashes or lesions      Lab Data:  Lab Results   Component Value Date    ABO A 04/15/2024    LABRH POS 04/15/2024    ABSCRN NEG 04/15/2024     Lab Results   Component Value Date    WBC 10.5 04/15/2024    HGB 12.3 04/15/2024    HCT 38.2 04/15/2024     04/15/2024     Lab Results   Component Value Date    GLUCOSE 80 04/15/2024     04/15/2024    K 3.5 04/15/2024     04/15/2024    CO2 20 (L) 04/15/2024    ANIONGAP 15 04/15/2024    BUN 5 (L) 04/15/2024    CREATININE 0.54 04/15/2024    EGFR >90 04/15/2024    CALCIUM 8.2 (L) 04/15/2024    ALBUMIN 2.9 (L) 04/15/2024    PROT 6.1 (L) 04/15/2024    ALKPHOS 139 (H) 04/15/2024    ALT 8 04/15/2024    AST 14 04/15/2024    BILITOT 0.3 04/15/2024     Sydney Concepcion, RAKAN-[facility]US

## 2024-04-18 VITALS
HEART RATE: 81 BPM | RESPIRATION RATE: 18 BRPM | OXYGEN SATURATION: 98 % | SYSTOLIC BLOOD PRESSURE: 149 MMHG | TEMPERATURE: 97.2 F | DIASTOLIC BLOOD PRESSURE: 95 MMHG

## 2024-04-18 LAB
LABORATORY COMMENT REPORT: NORMAL
PATH REPORT.FINAL DX SPEC: NORMAL
PATH REPORT.GROSS SPEC: NORMAL
PATH REPORT.RELEVANT HX SPEC: NORMAL
PATH REPORT.TOTAL CANCER: NORMAL

## 2024-04-18 PROCEDURE — 2500000001 HC RX 250 WO HCPCS SELF ADMINISTERED DRUGS (ALT 637 FOR MEDICARE OP): Performed by: MIDWIFE

## 2024-04-18 PROCEDURE — 99238 HOSP IP/OBS DSCHRG MGMT 30/<: CPT | Performed by: ADVANCED PRACTICE MIDWIFE

## 2024-04-18 RX ADMIN — ACETAMINOPHEN 975 MG: 325 TABLET ORAL at 07:51

## 2024-04-18 RX ADMIN — IBUPROFEN 600 MG: 600 TABLET ORAL at 07:51

## 2024-04-18 ASSESSMENT — PAIN SCALES - GENERAL
PAINLEVEL_OUTOF10: 0 - NO PAIN
PAINLEVEL_OUTOF10: 0 - NO PAIN

## 2024-04-18 NOTE — LACTATION NOTE
Lactation Consultant Note  Lactation Consultation  Reason for Consult: Follow-up assessment  Consultant Name: Idalia LACEY    Maternal Information  Has mother  before?: No  Infant to breast within first 2 hours of birth?: Yes  Exclusive Pump and Bottle Feed: No    Maternal Assessment       Infant Assessment       Feeding Assessment  Nutrition Source: Breastmilk  Feeding Method: Nursing at the breast    LATCH TOOL       Breast Pump       Other OB Lactation Tools       Patient Follow-up       Other OB Lactation Documentation       Recommendations/Summary  Mom reports that feeds are going well. Mom is able to latch baby independently. We reviewed some breastfeeding education. Mom has no further questions at this time. Outpatient lactation discussed. Mom will follow up as needed.

## 2024-04-18 NOTE — DISCHARGE SUMMARY
Discharge Summary    Admission Date: 4/15/2024  Discharge Date: 4/18/2024    Discharge Diagnosis  Normal labor ([facility])  Gestational hypertension    Hospital Course  Delivery Date: 4/15/2024  8:29 PM   Delivery type: Vaginal, Spontaneous    GA at delivery: 38w5d  Outcome: Living   Anesthesia during delivery: Epidural   Intrapartum complications: None   Feeding method: Breastfeeding Status: Yes  Procedures: none  Contraception at discharge: declines, will talk with Dr. Becerra postpartum    Pertinent Physical Exam At Time of Discharge  Constitutional: alert, oriented  Postpartum:  Fundus firm at U-2, light lochia rubra, perineum healing as expected  Breast:  Filling, nipples intact, breastfeeding successfully  Head/Neck: neck supple, no thyromegaly  Respiratory/Thorax: normal respiratory effort  Cardiovascular: RRR  Genitourinary: no CVAT, no suprapubic tenderness  Musculoskeletal: Full ROM  Extremities: no calf tenderness  Neurological: Oriented x3  Breast: no masses  Psychological: appropriate affect, family bonding evident  Skin: no rashes or lesions     Discharge Meds     Your medication list        START taking these medications        Instructions Last Dose Given Next Dose Due   blood pressure test kit-large kit      Use to check blood pressure 2 times a day.       docusate sodium 100 mg capsule  Commonly known as: Colace      Take 1 capsule (100 mg) by mouth 2 times a day as needed for constipation.       ibuprofen 600 mg tablet      Take 1 tablet (600 mg) by mouth every 6 hours if needed for mild pain (1 - 3).       Pain Relief ES (acetaminophen) 500 mg tablet  Generic drug: acetaminophen      Take 2 tablets (1,000 mg) by mouth every 6 hours if needed for mild pain (1 - 3).              CONTINUE taking these medications        Instructions Last Dose Given Next Dose Due   Prenatal Vitamin Plus Low Iron 27 mg iron- 1 mg tablet  Generic drug: prenatal vitamin calcium-iron-folic                     Where to Get  Your Medications        These medications were sent to [facility]  [address], [address], [facility] [id number]      Hours: 8 AM to 6 PM Mon-Fri, 9 AM to 1 PM Saturday Phone: [phone]   blood pressure test kit-large kit  docusate sodium 100 mg capsule  ibuprofen 600 mg tablet  Pain Relief ES (acetaminophen) 500 mg tablet          Complications Requiring Follow-Up  Gestational hypertension    Test Results Pending At Discharge  Pending Labs       Order Current Status    Surgical Pathology Exam - PLACENTA In process            Outpatient Follow-Up  Future Appointments   Date Time Provider Department Center   4/23/2024 11:00 AM Garrett Becerra DO [id number]     Pt has received her discharge meds and blood pressure cuff from [facility].  I spent 25 minutes in the professional and overall care of this patient.      RAAKN Magaña-JUSTUS

## 2024-04-23 ENCOUNTER — APPOINTMENT (OUTPATIENT)
Dept: OBSTETRICS AND GYNECOLOGY | Facility: CLINIC | Age: 19
End: 2024-04-23
Payer: COMMERCIAL

## 2024-04-30 ENCOUNTER — APPOINTMENT (OUTPATIENT)
Dept: OBSTETRICS AND GYNECOLOGY | Facility: CLINIC | Age: 19
End: 2024-04-30
Payer: COMMERCIAL

## 2024-05-21 ENCOUNTER — POSTPARTUM VISIT (OUTPATIENT)
Dept: OBSTETRICS AND GYNECOLOGY | Facility: CLINIC | Age: 19
End: 2024-05-21
Payer: COMMERCIAL

## 2024-05-21 VITALS
HEIGHT: 69 IN | DIASTOLIC BLOOD PRESSURE: 76 MMHG | SYSTOLIC BLOOD PRESSURE: 130 MMHG | WEIGHT: 182 LBS | BODY MASS INDEX: 26.96 KG/M2

## 2024-05-21 PROBLEM — B95.1 GROUP B STREPTOCOCCAL INFECTION DURING PREGNANCY (HHS-HCC): Status: RESOLVED | Noted: 2024-04-15 | Resolved: 2024-05-21

## 2024-05-21 PROBLEM — O98.819 GROUP B STREPTOCOCCAL INFECTION DURING PREGNANCY (HHS-HCC): Status: RESOLVED | Noted: 2024-04-15 | Resolved: 2024-05-21

## 2024-05-21 RX ORDER — NORETHINDRONE 0.35 MG/1
1 TABLET ORAL DAILY
Qty: 28 TABLET | Refills: 11 | Status: SHIPPED | OUTPATIENT
Start: 2024-05-21 | End: 2025-05-21

## 2024-05-21 NOTE — PROGRESS NOTES
Pia Olivo is a 19 y.o. female who presents with a chief complaint of Postpartum Care (4/15/2024 Vag )      SUBJECTIVE  Patient presents 4 weeks postpartum vaginal delivery.  She is doing well denies any problems.  She is breast-feeding.  She would like oral contraception so Harshil was called in.  She denies any depression, suicidal homicidal ideations or plan.  She is happy with her delivery    No past medical history on file.  No past surgical history on file.  Social History     Socioeconomic History    Marital status: Single     Spouse name: None    Number of children: None    Years of education: None    Highest education level: None   Occupational History    None   Tobacco Use    Smoking status: Never    Smokeless tobacco: Never   Substance and Sexual Activity    Alcohol use: Never    Drug use: Never    Sexual activity: Yes     Partners: Male     Birth control/protection: Emergency Contraception   Other Topics Concern    None   Social History Narrative    None     Social Determinants of Health     Financial Resource Strain: Low Risk  (4/15/2024)    Overall Financial Resource Strain (CARDIA)     Difficulty of Paying Living Expenses: Not hard at all   Food Insecurity: Food Insecurity Present (11/15/2023)    Received from [facility]    Hunger Vital Sign     Worried About Running Out of Food in the Last Year: Sometimes true     Ran Out of Food in the Last Year: Sometimes true   Transportation Needs: No Transportation Needs (4/15/2024)    PRAPARE - Transportation     Lack of Transportation (Medical): No     Lack of Transportation (Non-Medical): No   Physical Activity: Inactive (11/15/2023)    Received from [facility]    Exercise Vital Sign     Days of Exercise per Week: 0 days     Minutes of Exercise per Session: 0 min   Stress: No Stress Concern Present (11/15/2023)    Received from [facility]    [employer] - Occupational Stress Questionnaire     Feeling of Stress : Not at  all   Social Connections: Moderately Isolated (11/15/2023)    Received from [facility]    Social Connection and Isolation Panel [NHANES]     Frequency of Communication with Friends and Family: More than three times a week     Frequency of Social Gatherings with Friends and Family: Twice a week     Attends Yazidism Services: More than 4 times per year     Active Member of Clubs or Organizations: No     Attends Club or Organization Meetings: Never     Marital Status: Never    Intimate Partner Violence: Not At Risk (11/15/2023)    Received from [facility]    Humiliation, Afraid, Rape, and Kick questionnaire     Fear of Current or Ex-Partner: No     Emotionally Abused: No     Physically Abused: No     Sexually Abused: No     No family history on file.    OB History    Para Term  AB Living   2 1 1   1 1   SAB IAB Ectopic Multiple Live Births     1   0 1      # Outcome Date GA Lbr Chivo/2nd Weight Sex Delivery Anes PTL Lv   2 Term 04/15/24 38w5d 13:41 / 00:21 2.93 kg F Vag-Spont EPI  KASEY   1 IAB                OBJECTIVE  No Known Allergies   (Not in a hospital admission)       Review of Systems  History obtained from the patient  General ROS: negative  Psychological ROS: negative  Gastrointestinal ROS: no abdominal pain, change in bowel habits, or black or bloody stools  Musculoskeletal ROS: negative  Physical Exam  General Appearance: awake, alert, oriented, in no acute distress, well developed, well nourished, and in no acute distress  Skin: there are no suspicious lesions or rashes of concern, skin color, texture, turgor are normal; there are no bruises, rashes or lesions.  Head/Face: NCAT  Eyes: No gross abnormalities., PERRL, and EOMI  Neck: neck- supple, no mass, non-tender  Back: no pain to palpation  Abdomen: Soft, non-tender, normal bowel sounds; no bruits, organomegaly or masses.  Extremities: Extremities warm to touch, pink, with no edema.  Musculoskeletal: negative    /76   Ht  "1.753 m (5' 9\")   Wt 82.6 kg (182 lb)   LMP 06/27/2023   Breastfeeding Yes   BMI 26.88 kg/m²    Problem List Items Addressed This Visit    None  Visit Diagnoses       Encounter for postpartum visit ([facility])    -  Primary    Relevant Medications    norethindrone (Micronor) 0.35 mg tablet               "

## 2024-08-20 ENCOUNTER — APPOINTMENT (OUTPATIENT)
Dept: OBSTETRICS AND GYNECOLOGY | Facility: CLINIC | Age: 19
End: 2024-08-20
Payer: COMMERCIAL

## 2024-08-22 ENCOUNTER — APPOINTMENT (OUTPATIENT)
Dept: OBSTETRICS AND GYNECOLOGY | Facility: CLINIC | Age: 19
End: 2024-08-22
Payer: COMMERCIAL

## 2024-08-22 VITALS
DIASTOLIC BLOOD PRESSURE: 82 MMHG | HEIGHT: 69 IN | BODY MASS INDEX: 28.88 KG/M2 | WEIGHT: 195 LBS | SYSTOLIC BLOOD PRESSURE: 130 MMHG

## 2024-08-22 DIAGNOSIS — Z01.419 WOMEN'S ANNUAL ROUTINE GYNECOLOGICAL EXAMINATION: Primary | ICD-10-CM

## 2024-08-22 PROCEDURE — 3008F BODY MASS INDEX DOCD: CPT | Performed by: OBSTETRICS & GYNECOLOGY

## 2024-08-22 PROCEDURE — 1036F TOBACCO NON-USER: CPT | Performed by: OBSTETRICS & GYNECOLOGY

## 2024-08-22 PROCEDURE — 88175 CYTOPATH C/V AUTO FLUID REDO: CPT

## 2024-08-22 PROCEDURE — 99395 PREV VISIT EST AGE 18-39: CPT | Performed by: OBSTETRICS & GYNECOLOGY

## 2024-08-22 RX ORDER — NORETHINDRONE 0.35 MG/1
1 TABLET ORAL DAILY
Qty: 28 TABLET | Refills: 11 | Status: SHIPPED | OUTPATIENT
Start: 2024-08-22 | End: 2025-08-22

## 2024-08-22 NOTE — PROGRESS NOTES
Pia Olivo is a 19 y.o. female who presents with a chief complaint of Annual Exam      SUBJECTIVE  annual    History reviewed. No pertinent past medical history.  History reviewed. No pertinent surgical history.  Social History     Socioeconomic History    Marital status: Single   Tobacco Use    Smoking status: Never    Smokeless tobacco: Never   Vaping Use    Vaping status: Never Used   Substance and Sexual Activity    Alcohol use: Never    Drug use: Never    Sexual activity: Yes     Partners: Male     Birth control/protection: Emergency Contraception     Social Determinants of Health     Financial Resource Strain: Low Risk  (4/15/2024)    Overall Financial Resource Strain (CARDIA)     Difficulty of Paying Living Expenses: Not hard at all   Food Insecurity: Food Insecurity Present (11/15/2023)    Received from [facility]    Hunger Vital Sign     Worried About Running Out of Food in the Last Year: Sometimes true     Ran Out of Food in the Last Year: Sometimes true   Transportation Needs: No Transportation Needs (4/15/2024)    PRAPARE - Transportation     Lack of Transportation (Medical): No     Lack of Transportation (Non-Medical): No   Physical Activity: Inactive (11/15/2023)    Received from [facility]    Exercise Vital Sign     Days of Exercise per Week: 0 days     Minutes of Exercise per Session: 0 min   Stress: No Stress Concern Present (11/15/2023)    Received from [facility]    [employer] [place] [employer] - Occupational Stress Questionnaire     Feeling of Stress : Not at all   Social Connections: Moderately Isolated (11/15/2023)    Received from [facility]    Social Connection and Isolation Panel [NHANES]     Frequency of Communication with Friends and Family: More than three times a week     Frequency of Social Gatherings with Friends and Family: Twice a week     Attends Anglican Services: More than 4 times per year     Active Member  "of Clubs or Organizations: No     Attends Club or Organization Meetings: Never     Marital Status: Never    Intimate Partner Violence: Not At Risk (11/15/2023)    [facility] from [facility]    Humiliation, Afraid, Rape, and Kick questionnaire     Fear of Current or Ex-Partner: No     Emotionally Abused: No     Physically Abused: No     Sexually Abused: No     No family history on file.    OB History    Para Term  AB Living   2 1 1   1 1   SAB IAB Ectopic Multiple Live Births     1   0 1      # Outcome Date GA Lbr Chivo/2nd Weight Sex Type Anes PTL Lv   2 Term 04/15/24 38w5d 13:41 / 00:21 2.93 kg F Vag-Spont EPI  KASEY   1 IAB                OBJECTIVE  No Known Allergies   (Not in a hospital admission)       Review of Systems  History obtained from the patient  General ROS: negative  Psychological ROS: negative  Gastrointestinal ROS: no abdominal pain, change in bowel habits, or black or bloody stools  Musculoskeletal ROS: negative  Physical Exam  General Appearance: awake, alert, oriented, in no acute distress, well developed, well nourished, and in no acute distress  Skin: there are no suspicious lesions or rashes of concern, skin color, texture, turgor are normal; there are no bruises, rashes or lesions.  Head/Face: NCAT  Eyes: No gross abnormalities., PERRL, and EOMI  Neck: neck- supple, no mass, non-tender  Back: no pain to palpation  Breast: BREAST EXAM: normal  Abdomen: Soft, non-tender, normal bowel sounds; no bruits, organomegaly or masses.  Extremities: Extremities warm to touch, pink, with no edema.  Musculoskeletal: negative  Urogen: External genitalia: Normal, Vagina: Normal, Cervix: Normal, and Bimanual exam: Normal    /82   Ht 1.753 m (5' 9\")   Wt 88.5 kg (195 lb)   LMP 08/10/2024 (Exact Date)   Breastfeeding Yes   BMI 28.80 kg/m²    Problem List Items Addressed This Visit    None  Visit Diagnoses       Women's annual routine gynecological examination    -  " Primary    Relevant Orders    THINPREP PAP

## 2025-01-06 ENCOUNTER — APPOINTMENT (OUTPATIENT)
Dept: PRIMARY CARE | Facility: CLINIC | Age: 20
End: 2025-01-06
Payer: COMMERCIAL

## 2025-01-06 ENCOUNTER — LAB (OUTPATIENT)
Dept: LAB | Facility: LAB | Age: 20
End: 2025-01-06
Payer: COMMERCIAL

## 2025-01-06 VITALS
HEIGHT: 69 IN | BODY MASS INDEX: 28.88 KG/M2 | OXYGEN SATURATION: 99 % | WEIGHT: 195 LBS | SYSTOLIC BLOOD PRESSURE: 132 MMHG | RESPIRATION RATE: 21 BRPM | HEART RATE: 82 BPM | DIASTOLIC BLOOD PRESSURE: 80 MMHG

## 2025-01-06 DIAGNOSIS — R68.83 CHILLS: ICD-10-CM

## 2025-01-06 DIAGNOSIS — Z00.00 ANNUAL PHYSICAL EXAM: ICD-10-CM

## 2025-01-06 DIAGNOSIS — N92.6 IRREGULAR MENSES: ICD-10-CM

## 2025-01-06 DIAGNOSIS — Z00.00 ANNUAL PHYSICAL EXAM: Primary | ICD-10-CM

## 2025-01-06 DIAGNOSIS — R76.8 ANA POSITIVE: ICD-10-CM

## 2025-01-06 LAB
ALBUMIN SERPL BCP-MCNC: 4.3 G/DL (ref 3.4–5)
ALP SERPL-CCNC: 60 U/L (ref 33–110)
ALT SERPL W P-5'-P-CCNC: 7 U/L (ref 7–45)
ANION GAP SERPL CALC-SCNC: 14 MMOL/L (ref 10–20)
AST SERPL W P-5'-P-CCNC: 13 U/L (ref 9–39)
B-HCG SERPL-ACNC: <3 MIU/ML
BASOPHILS # BLD AUTO: 0.08 X10*3/UL (ref 0–0.1)
BASOPHILS NFR BLD AUTO: 1.1 %
BILIRUB SERPL-MCNC: 0.3 MG/DL (ref 0–1.2)
BUN SERPL-MCNC: 11 MG/DL (ref 6–23)
CALCIUM SERPL-MCNC: 9.8 MG/DL (ref 8.6–10.6)
CHLORIDE SERPL-SCNC: 106 MMOL/L (ref 98–107)
CHOLEST SERPL-MCNC: 199 MG/DL (ref 0–199)
CHOLESTEROL/HDL RATIO: 2.9
CO2 SERPL-SCNC: 27 MMOL/L (ref 21–32)
CREAT SERPL-MCNC: 0.58 MG/DL (ref 0.5–1.05)
EGFRCR SERPLBLD CKD-EPI 2021: >90 ML/MIN/1.73M*2
EOSINOPHIL # BLD AUTO: 0.13 X10*3/UL (ref 0–0.7)
EOSINOPHIL NFR BLD AUTO: 1.8 %
ERYTHROCYTE [DISTWIDTH] IN BLOOD BY AUTOMATED COUNT: 13 % (ref 11.5–14.5)
GLUCOSE SERPL-MCNC: 91 MG/DL (ref 74–99)
HCT VFR BLD AUTO: 44.9 % (ref 36–46)
HDLC SERPL-MCNC: 68 MG/DL
HGB BLD-MCNC: 14 G/DL (ref 12–16)
IMM GRANULOCYTES # BLD AUTO: 0.01 X10*3/UL (ref 0–0.7)
IMM GRANULOCYTES NFR BLD AUTO: 0.1 % (ref 0–0.9)
LDLC SERPL CALC-MCNC: 121 MG/DL
LYMPHOCYTES # BLD AUTO: 2.39 X10*3/UL (ref 1.2–4.8)
LYMPHOCYTES NFR BLD AUTO: 33.5 %
MCH RBC QN AUTO: 29.2 PG (ref 26–34)
MCHC RBC AUTO-ENTMCNC: 31.2 G/DL (ref 32–36)
MCV RBC AUTO: 94 FL (ref 80–100)
MONOCYTES # BLD AUTO: 0.83 X10*3/UL (ref 0.1–1)
MONOCYTES NFR BLD AUTO: 11.6 %
NEUTROPHILS # BLD AUTO: 3.7 X10*3/UL (ref 1.2–7.7)
NEUTROPHILS NFR BLD AUTO: 51.9 %
NON HDL CHOLESTEROL: 131 MG/DL (ref 0–149)
NRBC BLD-RTO: 0 /100 WBCS (ref 0–0)
PLATELET # BLD AUTO: 304 X10*3/UL (ref 150–450)
POTASSIUM SERPL-SCNC: 4.2 MMOL/L (ref 3.5–5.3)
PROT SERPL-MCNC: 7.8 G/DL (ref 6.4–8.2)
RBC # BLD AUTO: 4.79 X10*6/UL (ref 4–5.2)
SODIUM SERPL-SCNC: 143 MMOL/L (ref 136–145)
TRIGL SERPL-MCNC: 51 MG/DL (ref 0–114)
TSH SERPL-ACNC: 1.12 MIU/L (ref 0.44–3.98)
VLDL: 10 MG/DL (ref 0–40)
WBC # BLD AUTO: 7.1 X10*3/UL (ref 4.4–11.3)

## 2025-01-06 PROCEDURE — 84443 ASSAY THYROID STIM HORMONE: CPT

## 2025-01-06 PROCEDURE — 84702 CHORIONIC GONADOTROPIN TEST: CPT

## 2025-01-06 PROCEDURE — 86038 ANTINUCLEAR ANTIBODIES: CPT

## 2025-01-06 PROCEDURE — 99385 PREV VISIT NEW AGE 18-39: CPT | Performed by: INTERNAL MEDICINE

## 2025-01-06 PROCEDURE — 80061 LIPID PANEL: CPT

## 2025-01-06 PROCEDURE — 3008F BODY MASS INDEX DOCD: CPT | Performed by: INTERNAL MEDICINE

## 2025-01-06 PROCEDURE — 80053 COMPREHEN METABOLIC PANEL: CPT

## 2025-01-06 PROCEDURE — 1036F TOBACCO NON-USER: CPT | Performed by: INTERNAL MEDICINE

## 2025-01-06 PROCEDURE — 99203 OFFICE O/P NEW LOW 30 MIN: CPT | Performed by: INTERNAL MEDICINE

## 2025-01-06 PROCEDURE — 85025 COMPLETE CBC W/AUTO DIFF WBC: CPT

## 2025-01-06 ASSESSMENT — ENCOUNTER SYMPTOMS
ENDOCRINE NEGATIVE: 1
CARDIOVASCULAR NEGATIVE: 1
PSYCHIATRIC NEGATIVE: 1
EYES NEGATIVE: 1
CONSTITUTIONAL NEGATIVE: 1
HEMATOLOGIC/LYMPHATIC NEGATIVE: 1
ALLERGIC/IMMUNOLOGIC NEGATIVE: 1
GASTROINTESTINAL NEGATIVE: 1
NEUROLOGICAL NEGATIVE: 1
MUSCULOSKELETAL NEGATIVE: 1
RESPIRATORY NEGATIVE: 1

## 2025-01-06 NOTE — ASSESSMENT & PLAN NOTE
No recent hospitalizations.    All medications reviewed and reconciled by me the provider..  No use of controlled substances or opiates.    Family history, social history reviewed, no changes.    Patient does not smoke.    Patient does not drink.    Patient hydrates adequately daily.  Eats a well-balanced healthy diet.     Exercises adequately including walking and doing weightbearing exercises.    Patient denies any difficulty with memory or cognition.     Denies any difficulty with hearing.  Patient does not wear hearing aids.    No fall risk.  No recent falls.  Denies any difficulty walking.    Patient with no history of depression anxiety, denies any loss of interest, no feeling of sadness, no lack of motivation.    Preventive measures - Recommend ASAP : PAP/Mamogram and refer patient to GYN. Specialist.

## 2025-01-06 NOTE — PROGRESS NOTES
"Subjective   Patient ID: Pia Olivo is a 19 y.o. female who presents for New Patient Visit (New patient-est. Care ). Feels very cold possibly anemia never had anemia before     HPI     Review of Systems   Constitutional: Negative.    HENT: Negative.     Eyes: Negative.    Respiratory: Negative.     Cardiovascular: Negative.    Gastrointestinal: Negative.    Endocrine: Negative.    Musculoskeletal: Negative.    Skin: Negative.    Allergic/Immunologic: Negative.    Neurological: Negative.    Hematological: Negative.    Psychiatric/Behavioral: Negative.     All other systems reviewed and are negative.      Objective   /80   Pulse 82   Resp 21   Ht 1.753 m (5' 9\")   Wt 88.5 kg (195 lb)   SpO2 99%   BMI 28.80 kg/m²     Physical Exam  Vitals and nursing note reviewed.   Constitutional:       Appearance: Normal appearance.   HENT:      Head: Normocephalic and atraumatic.      Right Ear: Tympanic membrane, ear canal and external ear normal.      Left Ear: Tympanic membrane, ear canal and external ear normal. There is no impacted cerumen.      Nose: Nose normal.      Mouth/Throat:      Mouth: Mucous membranes are moist.      Pharynx: Oropharynx is clear.   Eyes:      Extraocular Movements: Extraocular movements intact.      Conjunctiva/sclera: Conjunctivae normal.      Pupils: Pupils are equal, round, and reactive to light.   Cardiovascular:      Rate and Rhythm: Normal rate and regular rhythm.      Pulses: Normal pulses.      Heart sounds: Normal heart sounds. No murmur heard.  Pulmonary:      Effort: Pulmonary effort is normal. No respiratory distress.      Breath sounds: Normal breath sounds. No stridor. No wheezing, rhonchi or rales.   Chest:      Chest wall: No tenderness.   Abdominal:      General: Abdomen is flat. Bowel sounds are normal. There is no distension.      Palpations: Abdomen is soft. There is no mass.      Tenderness: There is no abdominal tenderness. There is no right CVA " tenderness, left CVA tenderness, guarding or rebound.      Hernia: No hernia is present.   Musculoskeletal:         General: Normal range of motion.      Cervical back: Normal range of motion and neck supple.   Skin:     General: Skin is warm.      Capillary Refill: Capillary refill takes less than 2 seconds.   Neurological:      General: No focal deficit present.      Mental Status: She is alert.      Cranial Nerves: No cranial nerve deficit.      Sensory: No sensory deficit.      Motor: No weakness.      Coordination: Coordination normal.      Gait: Gait normal.      Deep Tendon Reflexes: Reflexes normal.   Psychiatric:         Mood and Affect: Mood normal.         Behavior: Behavior normal. Behavior is cooperative.         Thought Content: Thought content normal.         Judgment: Judgment normal.         Assessment/Plan   Problem List Items Addressed This Visit             ICD-10-CM    Annual physical exam - Primary Z00.00     No recent hospitalizations.    All medications reviewed and reconciled by me the provider..  No use of controlled substances or opiates.    Family history, social history reviewed, no changes.    Patient does not smoke.    Patient does not drink.    Patient hydrates adequately daily.  Eats a well-balanced healthy diet.     Exercises adequately including walking and doing weightbearing exercises.    Patient denies any difficulty with memory or cognition.     Denies any difficulty with hearing.  Patient does not wear hearing aids.    No fall risk.  No recent falls.  Denies any difficulty walking.    Patient with no history of depression anxiety, denies any loss of interest, no feeling of sadness, no lack of motivation.    Preventive measures - Recommend ASAP : PAP/Mamogram and refer patient to GYN. Specialist.          Relevant Orders    CBC and Auto Differential    Comprehensive Metabolic Panel    Lipid Panel    TSH with reflex to Free T4 if abnormal    Chills R68.83     Feeling cold and  she feels chills and freezing more than usual  - she had one episode 4 months ago when she felt chilled and was diaphoretic and felt hot to touch          Relevant Orders    TSH with reflex to Free T4 if abnormal    Irregular menses N92.6    Relevant Orders    HCG, quantitative, pregnancy    JULIANE positive R76.8    Relevant Orders    JULIANE without Reflex LU

## 2025-01-06 NOTE — ASSESSMENT & PLAN NOTE
Feeling cold and she feels chills and freezing more than usual  - she had one episode 4 months ago when she felt chilled and was diaphoretic and felt hot to touch

## 2025-01-07 LAB — ANA SER QL HEP2 SUBST: NEGATIVE

## 2025-08-05 ENCOUNTER — TELEPHONE (OUTPATIENT)
Facility: CLINIC | Age: 20
End: 2025-08-05
Payer: COMMERCIAL

## 2025-08-05 RX ORDER — NORETHINDRONE 0.35 MG/1
1 TABLET ORAL DAILY
Qty: 28 TABLET | Refills: 11 | Status: SHIPPED | OUTPATIENT
Start: 2025-08-05 | End: 2026-08-05

## 2025-08-25 PROBLEM — R68.83 CHILLS: Status: RESOLVED | Noted: 2025-01-06 | Resolved: 2025-08-25

## 2025-08-25 PROBLEM — N92.6 IRREGULAR MENSES: Status: RESOLVED | Noted: 2025-01-06 | Resolved: 2025-08-25

## 2025-08-25 PROBLEM — Z00.00 ANNUAL PHYSICAL EXAM: Status: RESOLVED | Noted: 2025-01-06 | Resolved: 2025-08-25

## 2025-08-25 PROBLEM — R76.8 ANA POSITIVE: Status: RESOLVED | Noted: 2025-01-06 | Resolved: 2025-08-25

## 2025-08-26 ENCOUNTER — OFFICE VISIT (OUTPATIENT)
Facility: CLINIC | Age: 20
End: 2025-08-26
Payer: COMMERCIAL

## 2025-08-26 ENCOUNTER — APPOINTMENT (OUTPATIENT)
Dept: OBSTETRICS AND GYNECOLOGY | Facility: CLINIC | Age: 20
End: 2025-08-26
Payer: COMMERCIAL

## 2025-08-26 VITALS
WEIGHT: 209.4 LBS | SYSTOLIC BLOOD PRESSURE: 125 MMHG | BODY MASS INDEX: 31.01 KG/M2 | HEIGHT: 69 IN | DIASTOLIC BLOOD PRESSURE: 76 MMHG

## 2025-08-26 DIAGNOSIS — Z11.3 ROUTINE SCREENING FOR STI (SEXUALLY TRANSMITTED INFECTION): ICD-10-CM

## 2025-08-26 DIAGNOSIS — N92.6 IRREGULAR BLEEDING: ICD-10-CM

## 2025-08-26 DIAGNOSIS — Z01.419 WELL WOMAN EXAM WITH ROUTINE GYNECOLOGICAL EXAM: Primary | ICD-10-CM

## 2025-08-26 DIAGNOSIS — Z30.09 BIRTH CONTROL COUNSELING: ICD-10-CM

## 2025-08-26 DIAGNOSIS — Z11.3 SCREEN FOR STD (SEXUALLY TRANSMITTED DISEASE): ICD-10-CM

## 2025-08-26 PROCEDURE — 1036F TOBACCO NON-USER: CPT | Performed by: OBSTETRICS & GYNECOLOGY

## 2025-08-26 PROCEDURE — 99395 PREV VISIT EST AGE 18-39: CPT | Performed by: OBSTETRICS & GYNECOLOGY

## 2025-08-26 PROCEDURE — 3008F BODY MASS INDEX DOCD: CPT | Performed by: OBSTETRICS & GYNECOLOGY

## 2025-08-26 PROCEDURE — 99459 PELVIC EXAMINATION: CPT | Performed by: OBSTETRICS & GYNECOLOGY

## 2025-08-26 RX ORDER — NORGESTIMATE AND ETHINYL ESTRADIOL 0.25-0.035
1 KIT ORAL DAILY
Qty: 28 TABLET | Refills: 12 | Status: SHIPPED | OUTPATIENT
Start: 2025-08-26 | End: 2026-08-26

## 2025-08-26 ASSESSMENT — PAIN SCALES - GENERAL: PAINLEVEL_OUTOF10: 0-NO PAIN

## 2025-08-27 DIAGNOSIS — A74.9 CHLAMYDIA INFECTION: Primary | ICD-10-CM

## 2025-08-27 LAB
C TRACH RRNA SPEC QL NAA+PROBE: DETECTED
N GONORRHOEA RRNA SPEC QL NAA+PROBE: NOT DETECTED
QUEST GC CT AMPLIFIED (ALWAYS MESSAGE): ABNORMAL
T VAGINALIS RRNA SPEC QL NAA+PROBE: NOT DETECTED

## 2025-08-27 RX ORDER — DOXYCYCLINE 100 MG/1
100 CAPSULE ORAL 2 TIMES DAILY
Qty: 14 CAPSULE | Refills: 0 | Status: SHIPPED | OUTPATIENT
Start: 2025-08-27 | End: 2025-09-03

## 2025-09-05 ENCOUNTER — APPOINTMENT (OUTPATIENT)
Dept: OBSTETRICS AND GYNECOLOGY | Facility: CLINIC | Age: 20
End: 2025-09-05
Payer: COMMERCIAL

## 2025-09-15 ENCOUNTER — APPOINTMENT (OUTPATIENT)
Dept: OBSTETRICS AND GYNECOLOGY | Facility: CLINIC | Age: 20
End: 2025-09-15
Payer: COMMERCIAL

## 2025-09-24 ENCOUNTER — APPOINTMENT (OUTPATIENT)
Facility: CLINIC | Age: 20
End: 2025-09-24
Payer: COMMERCIAL